# Patient Record
Sex: MALE | Race: WHITE | NOT HISPANIC OR LATINO | Employment: OTHER | URBAN - METROPOLITAN AREA
[De-identification: names, ages, dates, MRNs, and addresses within clinical notes are randomized per-mention and may not be internally consistent; named-entity substitution may affect disease eponyms.]

---

## 2019-12-11 ENCOUNTER — TRANSFERRED RECORDS (OUTPATIENT)
Dept: MULTI SPECIALTY CLINIC | Facility: CLINIC | Age: 64
End: 2019-12-11

## 2021-05-25 ENCOUNTER — RECORDS - HEALTHEAST (OUTPATIENT)
Dept: ADMINISTRATIVE | Facility: CLINIC | Age: 66
End: 2021-05-25

## 2022-07-01 ENCOUNTER — TRANSFERRED RECORDS (OUTPATIENT)
Dept: MULTI SPECIALTY CLINIC | Facility: CLINIC | Age: 67
End: 2022-07-01

## 2024-05-21 ENCOUNTER — TRANSCRIBE ORDERS (OUTPATIENT)
Dept: OTHER | Age: 69
End: 2024-05-21

## 2024-05-21 DIAGNOSIS — Z47.89 AFTERCARE FOLLOWING SURGERY OF THE MUSCULOSKELETAL SYSTEM: Primary | ICD-10-CM

## 2024-05-31 ENCOUNTER — THERAPY VISIT (OUTPATIENT)
Dept: PHYSICAL THERAPY | Facility: CLINIC | Age: 69
End: 2024-05-31
Payer: COMMERCIAL

## 2024-05-31 DIAGNOSIS — S99.912A LEFT ANKLE INJURY: Primary | ICD-10-CM

## 2024-05-31 PROCEDURE — 97112 NEUROMUSCULAR REEDUCATION: CPT | Mod: GP | Performed by: PHYSICAL THERAPIST

## 2024-05-31 PROCEDURE — 97110 THERAPEUTIC EXERCISES: CPT | Mod: GP | Performed by: PHYSICAL THERAPIST

## 2024-05-31 PROCEDURE — 97162 PT EVAL MOD COMPLEX 30 MIN: CPT | Mod: GP | Performed by: PHYSICAL THERAPIST

## 2024-05-31 NOTE — PROGRESS NOTES
PHYSICAL THERAPY EVALUATION  Type of Visit: Evaluation    See electronic medical record for Abuse and Falls Screening details.    Subjective       Presenting condition or subjective complaint: LEft ankle ORIF,  he had a stroke and then fell and fractured ankle.  He has CP but prior to injury was not using a cane or any assistve device.  He is in the clinic today using cane.  Date of onset: 02/24/24    Relevant medical history: Cancer; High blood pressure   Dates & types of surgery: 2/24/2024 left medial maleolus ORIF    Prior diagnostic imaging/testing results: X-ray     Prior therapy history for the same diagnosis, illness or injury:        Prior Level of Function  Transfers:   Ambulation:   ADL:   IADL:     Living Environment  Social support: With family members   Type of home: House   Stairs to enter the home:         Ramp:     Stairs inside the home: Yes (has a chairlift)       Help at home:    Equipment owned: Straight Cane; Walker; Standard wheelchair; Grab bars     Employment:      Hobbies/Interests: fishing, yardwork    Patient goals for therapy: Walk normally    Pain assessment: Pain denied     Objective   FOOT/ANKLE EVALUATION    INTEGUMENTARY (edema, incisions): WNL  POSTURE:   GAIT:   Weightbearing Status:   Assistive Device(s):   Gait Deviations: Stride length decreased  Toe out left  BALANCE/PROPRIOCEPTION: Single Leg Stance Eyes Open (seconds): unable  WEIGHT BEARING ALIGNMENT:   NON-WEIGHTBEARING ALIGNMENT:    ROM:   (Degrees) Left AROM Left PROM  Right AROM Right PROM   Ankle Dorsiflexion 0 deg      Ankle Plantarflexion 60 degrees      Ankle Inversion 30      Ankle Eversion 20      Great Toe Flexion       Great Toe Extension       Pain:   End feel:     STRENGTH:  5/5 DF, EV, INV, 4/5 PF  FLEXIBILITY:  decreased gastroc flexiblity  SPECIAL TESTS:   FUNCTIONAL TESTS:   PALPATION:   JOINT MOBILITY:  good subtalar mobility, decreased talocrural mobility    Assessment & Plan   CLINICAL  IMPRESSIONS  Medical Diagnosis: Left ankle ORIF    Treatment Diagnosis: Left ankle ORIF   Impression/Assessment: Patient is a 68 year old male with left ankle complaints.  The following significant findings have been identified: Decreased ROM/flexibility, Decreased strength, and Impaired muscle performance. These impairments interfere with their ability to perform recreational activities, household mobility, and community mobility as compared to previous level of function.     Clinical Decision Making (Complexity):  Clinical Presentation: Evolving/Changing  Clinical Presentation Rationale: based on medical and personal factors listed in PT evaluation  Clinical Decision Making (Complexity): Moderate complexity    PLAN OF CARE  Treatment Interventions:  Interventions: Manual Therapy, Neuromuscular Re-education, Therapeutic Activity, Therapeutic Exercise    Long Term Goals     PT Goal 1  Goal Identifier: walking  Goal Description: Ashkan will be able to walk without assive device with minimal gait abnormality  Rationale: to maximize safety and independence within the home;to maximize safety and independence within the community  Target Date: 07/26/24      Frequency of Treatment: 2 x per week x 4 weeks decreasing to 1 x per week x 4 weeks  Duration of Treatment: 8 weeks    Recommended Referrals to Other Professionals:   Education Assessment:        Risks and benefits of evaluation/treatment have been explained.   Patient/Family/caregiver agrees with Plan of Care.     Evaluation Time:     PT Eval, Moderate Complexity Minutes (14944): 15       Signing Clinician: Tammy Fuentes, PT      [facility]                                                                                   OUTPATIENT PHYSICAL THERAPY      PLAN OF TREATMENT FOR OUTPATIENT REHABILITATION   Patient's Last Name, First Name, NITHIN RodriguezAshkan  SAM YOB: 1955   Provider's Name   [facility]   Medical Record No.  [id number]     Onset Date: 02/24/24  Start of Care Date:       Medical Diagnosis:  Left ankle ORIF      PT Treatment Diagnosis:  Left ankle ORIF Plan of Treatment  Frequency/Duration: 2 x per week x 4 weeks decreasing to 1 x per week x 4 weeks/ 8 weeks    Certification date from 05/31/24 to 07/26/24         See note for plan of treatment details and functional goals     Tammy Fuentes, PT                         I CERTIFY THE NEED FOR THESE SERVICES FURNISHED UNDER        THIS PLAN OF TREATMENT AND WHILE UNDER MY CARE .             Physician Signature               Date    X_____________________________________________________                  Referring Provider:  Abisai Milan    Initial Assessment  See Epic Evaluation-

## 2024-06-07 ENCOUNTER — THERAPY VISIT (OUTPATIENT)
Dept: PHYSICAL THERAPY | Facility: CLINIC | Age: 69
End: 2024-06-07
Payer: COMMERCIAL

## 2024-06-07 DIAGNOSIS — S99.912A LEFT ANKLE INJURY: Primary | ICD-10-CM

## 2024-06-07 PROCEDURE — 97110 THERAPEUTIC EXERCISES: CPT | Mod: GP | Performed by: PHYSICAL THERAPIST

## 2024-06-07 PROCEDURE — 97140 MANUAL THERAPY 1/> REGIONS: CPT | Mod: GP | Performed by: PHYSICAL THERAPIST

## 2024-06-07 PROCEDURE — 97112 NEUROMUSCULAR REEDUCATION: CPT | Mod: GP | Performed by: PHYSICAL THERAPIST

## 2024-06-11 ENCOUNTER — THERAPY VISIT (OUTPATIENT)
Dept: PHYSICAL THERAPY | Facility: CLINIC | Age: 69
End: 2024-06-11
Payer: COMMERCIAL

## 2024-06-11 DIAGNOSIS — S99.912D INJURY OF LEFT ANKLE, SUBSEQUENT ENCOUNTER: Primary | ICD-10-CM

## 2024-06-11 PROCEDURE — 97112 NEUROMUSCULAR REEDUCATION: CPT | Mod: GP

## 2024-06-11 PROCEDURE — 97110 THERAPEUTIC EXERCISES: CPT | Mod: GP

## 2024-06-17 ENCOUNTER — THERAPY VISIT (OUTPATIENT)
Dept: PHYSICAL THERAPY | Facility: CLINIC | Age: 69
End: 2024-06-17
Payer: COMMERCIAL

## 2024-06-17 DIAGNOSIS — S99.912D INJURY OF LEFT ANKLE, SUBSEQUENT ENCOUNTER: Primary | ICD-10-CM

## 2024-06-17 PROCEDURE — 97112 NEUROMUSCULAR REEDUCATION: CPT | Mod: GP | Performed by: PHYSICAL THERAPIST

## 2024-06-17 PROCEDURE — 97110 THERAPEUTIC EXERCISES: CPT | Mod: GP | Performed by: PHYSICAL THERAPIST

## 2024-06-17 PROCEDURE — 97140 MANUAL THERAPY 1/> REGIONS: CPT | Mod: GP | Performed by: PHYSICAL THERAPIST

## 2024-06-19 ENCOUNTER — THERAPY VISIT (OUTPATIENT)
Dept: PHYSICAL THERAPY | Facility: CLINIC | Age: 69
End: 2024-06-19
Payer: COMMERCIAL

## 2024-06-19 DIAGNOSIS — S99.912D INJURY OF LEFT ANKLE, SUBSEQUENT ENCOUNTER: Primary | ICD-10-CM

## 2024-06-19 PROCEDURE — 97110 THERAPEUTIC EXERCISES: CPT | Mod: GP | Performed by: PHYSICAL THERAPIST

## 2024-06-19 PROCEDURE — 97112 NEUROMUSCULAR REEDUCATION: CPT | Mod: GP | Performed by: PHYSICAL THERAPIST

## 2024-06-19 PROCEDURE — 97140 MANUAL THERAPY 1/> REGIONS: CPT | Mod: GP | Performed by: PHYSICAL THERAPIST

## 2024-06-24 ENCOUNTER — THERAPY VISIT (OUTPATIENT)
Dept: PHYSICAL THERAPY | Facility: CLINIC | Age: 69
End: 2024-06-24
Payer: COMMERCIAL

## 2024-06-24 DIAGNOSIS — S99.912D INJURY OF LEFT ANKLE, SUBSEQUENT ENCOUNTER: Primary | ICD-10-CM

## 2024-06-24 PROCEDURE — 97112 NEUROMUSCULAR REEDUCATION: CPT | Mod: GP | Performed by: PHYSICAL THERAPIST

## 2024-06-24 PROCEDURE — 97140 MANUAL THERAPY 1/> REGIONS: CPT | Mod: GP | Performed by: PHYSICAL THERAPIST

## 2024-06-24 PROCEDURE — 97110 THERAPEUTIC EXERCISES: CPT | Mod: GP | Performed by: PHYSICAL THERAPIST

## 2024-06-26 ENCOUNTER — THERAPY VISIT (OUTPATIENT)
Dept: PHYSICAL THERAPY | Facility: CLINIC | Age: 69
End: 2024-06-26
Payer: COMMERCIAL

## 2024-06-26 DIAGNOSIS — S99.912D INJURY OF LEFT ANKLE, SUBSEQUENT ENCOUNTER: Primary | ICD-10-CM

## 2024-06-26 PROCEDURE — 97140 MANUAL THERAPY 1/> REGIONS: CPT | Mod: GP | Performed by: PHYSICAL THERAPIST

## 2024-06-26 PROCEDURE — 97110 THERAPEUTIC EXERCISES: CPT | Mod: GP | Performed by: PHYSICAL THERAPIST

## 2024-06-26 PROCEDURE — 97112 NEUROMUSCULAR REEDUCATION: CPT | Mod: GP | Performed by: PHYSICAL THERAPIST

## 2024-07-01 ENCOUNTER — OFFICE VISIT (OUTPATIENT)
Dept: FAMILY MEDICINE | Facility: CLINIC | Age: 69
End: 2024-07-01
Payer: COMMERCIAL

## 2024-07-01 VITALS
OXYGEN SATURATION: 98 % | DIASTOLIC BLOOD PRESSURE: 75 MMHG | HEART RATE: 65 BPM | HEIGHT: 69 IN | WEIGHT: 189.38 LBS | SYSTOLIC BLOOD PRESSURE: 111 MMHG | RESPIRATION RATE: 20 BRPM | BODY MASS INDEX: 28.05 KG/M2 | TEMPERATURE: 97.8 F

## 2024-07-01 DIAGNOSIS — Z85.238 HISTORY OF THYMUS CANCER: ICD-10-CM

## 2024-07-01 DIAGNOSIS — I77.810 ASCENDING AORTA DILATION (H): ICD-10-CM

## 2024-07-01 DIAGNOSIS — I25.10 CORONARY ARTERY CALCIFICATION SEEN ON CAT SCAN: ICD-10-CM

## 2024-07-01 DIAGNOSIS — Z87.81 S/P ORIF (OPEN REDUCTION INTERNAL FIXATION) FRACTURE: ICD-10-CM

## 2024-07-01 DIAGNOSIS — Z12.11 SCREEN FOR COLON CANCER: ICD-10-CM

## 2024-07-01 DIAGNOSIS — I63.00 CEREBRAL INFARCTION DUE TO THROMBOSIS OF PRECEREBRAL ARTERY (H): Primary | ICD-10-CM

## 2024-07-01 DIAGNOSIS — K80.20 CALCULUS OF GALLBLADDER WITHOUT CHOLECYSTITIS WITHOUT OBSTRUCTION: ICD-10-CM

## 2024-07-01 DIAGNOSIS — N40.0 BENIGN PROSTATIC HYPERPLASIA WITHOUT LOWER URINARY TRACT SYMPTOMS: ICD-10-CM

## 2024-07-01 DIAGNOSIS — R74.8 ELEVATED LIVER ENZYMES: ICD-10-CM

## 2024-07-01 DIAGNOSIS — E78.5 HYPERLIPIDEMIA LDL GOAL <100: ICD-10-CM

## 2024-07-01 DIAGNOSIS — G81.12 SPASTIC HEMIPARESIS OF LEFT DOMINANT SIDE (H): ICD-10-CM

## 2024-07-01 DIAGNOSIS — R73.01 ELEVATED FASTING GLUCOSE: ICD-10-CM

## 2024-07-01 DIAGNOSIS — Z11.59 NEED FOR HEPATITIS C SCREENING TEST: ICD-10-CM

## 2024-07-01 DIAGNOSIS — I10 BENIGN ESSENTIAL HYPERTENSION: ICD-10-CM

## 2024-07-01 DIAGNOSIS — G80.9 CEREBRAL PALSY, UNSPECIFIED TYPE (H): ICD-10-CM

## 2024-07-01 DIAGNOSIS — Z98.890 S/P ORIF (OPEN REDUCTION INTERNAL FIXATION) FRACTURE: ICD-10-CM

## 2024-07-01 LAB
ALBUMIN SERPL BCG-MCNC: 4.9 G/DL (ref 3.5–5.2)
ALP SERPL-CCNC: 79 U/L (ref 40–150)
ALT SERPL W P-5'-P-CCNC: 28 U/L (ref 0–70)
ANION GAP SERPL CALCULATED.3IONS-SCNC: 10 MMOL/L (ref 7–15)
AST SERPL W P-5'-P-CCNC: 28 U/L (ref 0–45)
BILIRUB SERPL-MCNC: 1.5 MG/DL
BUN SERPL-MCNC: 14.1 MG/DL (ref 8–23)
CALCIUM SERPL-MCNC: 9.4 MG/DL (ref 8.8–10.2)
CHLORIDE SERPL-SCNC: 104 MMOL/L (ref 98–107)
CHOLEST SERPL-MCNC: 118 MG/DL
CREAT SERPL-MCNC: 0.99 MG/DL (ref 0.67–1.17)
DEPRECATED HCO3 PLAS-SCNC: 26 MMOL/L (ref 22–29)
EGFRCR SERPLBLD CKD-EPI 2021: 82 ML/MIN/1.73M2
FASTING STATUS PATIENT QL REPORTED: YES
FASTING STATUS PATIENT QL REPORTED: YES
GLUCOSE SERPL-MCNC: 96 MG/DL (ref 70–99)
HBA1C MFR BLD: 5.7 % (ref 0–5.6)
HCV AB SERPL QL IA: NONREACTIVE
HDLC SERPL-MCNC: 57 MG/DL
LDLC SERPL CALC-MCNC: 41 MG/DL
NONHDLC SERPL-MCNC: 61 MG/DL
POTASSIUM SERPL-SCNC: 4.1 MMOL/L (ref 3.4–5.3)
PROT SERPL-MCNC: 8 G/DL (ref 6.4–8.3)
SODIUM SERPL-SCNC: 140 MMOL/L (ref 135–145)
TRIGL SERPL-MCNC: 101 MG/DL

## 2024-07-01 PROCEDURE — 83036 HEMOGLOBIN GLYCOSYLATED A1C: CPT | Performed by: FAMILY MEDICINE

## 2024-07-01 PROCEDURE — 80061 LIPID PANEL: CPT | Performed by: FAMILY MEDICINE

## 2024-07-01 PROCEDURE — 80053 COMPREHEN METABOLIC PANEL: CPT | Performed by: FAMILY MEDICINE

## 2024-07-01 PROCEDURE — 99204 OFFICE O/P NEW MOD 45 MIN: CPT | Performed by: FAMILY MEDICINE

## 2024-07-01 PROCEDURE — G2211 COMPLEX E/M VISIT ADD ON: HCPCS | Performed by: FAMILY MEDICINE

## 2024-07-01 PROCEDURE — 86803 HEPATITIS C AB TEST: CPT | Performed by: FAMILY MEDICINE

## 2024-07-01 PROCEDURE — 36415 COLL VENOUS BLD VENIPUNCTURE: CPT | Performed by: FAMILY MEDICINE

## 2024-07-01 RX ORDER — ATORVASTATIN CALCIUM 40 MG/1
40 TABLET, FILM COATED ORAL DAILY
COMMUNITY
End: 2024-09-24

## 2024-07-01 RX ORDER — ASPIRIN 81 MG/1
81 TABLET ORAL DAILY
COMMUNITY
Start: 2024-03-29

## 2024-07-01 RX ORDER — AMLODIPINE BESYLATE 2.5 MG/1
2.5 TABLET ORAL DAILY
COMMUNITY
End: 2024-09-24

## 2024-07-01 RX ORDER — RESPIRATORY SYNCYTIAL VIRUS VACCINE 120MCG/0.5
0.5 KIT INTRAMUSCULAR ONCE
Qty: 1 EACH | Refills: 0 | Status: CANCELLED | OUTPATIENT
Start: 2024-07-01 | End: 2024-07-01

## 2024-07-01 RX ORDER — MULTIPLE VITAMINS W/ MINERALS TAB 9MG-400MCG
1 TAB ORAL DAILY
COMMUNITY

## 2024-07-01 RX ORDER — GABAPENTIN 100 MG/1
1 CAPSULE ORAL AT BEDTIME
COMMUNITY
Start: 2024-05-20 | End: 2024-09-24

## 2024-07-01 RX ORDER — TAMSULOSIN HYDROCHLORIDE 0.4 MG/1
0.4 CAPSULE ORAL DAILY
COMMUNITY
Start: 2024-03-12 | End: 2024-09-24

## 2024-07-01 ASSESSMENT — PAIN SCALES - GENERAL: PAINLEVEL: NO PAIN (0)

## 2024-07-01 NOTE — PROGRESS NOTES
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 (H)  Reviewed documents and reports from care everywhere  Continue with baby aspirin, statin continue to control underlying lipids, hypertension,  Recommended to start seeing neurologist within the system  Continue home physical therapy rehab exercises/upper and lower extremities  - Adult Cardiology Eval  Referral; Future  - Adult Neurology  Referral; Future    Spastic hemiparesis of left dominant side (H)  Residual effect from recent CVA in February 2024  Other plan as mentioned above  - Adult Neurology  Referral; Future    Benign essential hypertension  BP Readings from Last 6 Encounters:   07/01/24 111/75     Blood pressure is at goal on current dose of amlodipine 2.5 mg once daily      Hyperlipidemia LDL goal <100  Patient is currently taking Lipitor 40 mg daily  He wants to have a recheck today  Will get the labs today  Will f/u on results and call with recommendations.    - Comprehensive metabolic panel (BMP + Alb, Alk Phos, ALT, AST, Total. Bili, TP); Future  - Lipid panel reflex to direct LDL Fasting; Future  - Adult Cardiology Eval  Referral; Future  - Comprehensive metabolic panel (BMP + Alb, Alk Phos, ALT, AST, Total. Bili, TP)  - Lipid panel reflex to direct LDL Fasting    S/P ORIF (open reduction internal fixation) fracture, left ankle  That happened following a fall when he sustained his CVA in February 2024  Patient had surgery on 3/29/2024  He is currently doing well, continue to monitor    Screen for colon cancer  Patient reports having a colonoscopy in December 2019 that did not [facility]  I am not able to see the results through Care Everywhere  Will send release of information at his next visit to [facility] to get reports    Need for hepatitis C screening test    - Hepatitis C Screen Reflex to HCV RNA Quant and Genotype; Future  - Hepatitis C Screen Reflex to HCV  "RNA Quant and Genotype    Benign prostatic hyperplasia without lower urinary tract symptoms  On Flomax daily    Elevated liver enzymes  Patient has history of elevated liver enzymes and he is concerned  He was drinking 1-2 beers per day until February 2024 after which he has not consumed any alcohol  - Comprehensive metabolic panel (BMP + Alb, Alk Phos, ALT, AST, Total. Bili, TP); Future  - Comprehensive metabolic panel (BMP + Alb, Alk Phos, ALT, AST, Total. Bili, TP)    Elevated fasting glucose  Will f/u on results and call with recommendations.    - Comprehensive metabolic panel (BMP + Alb, Alk Phos, ALT, AST, Total. Bili, TP); Future  - Hemoglobin A1c; Future  - Comprehensive metabolic panel (BMP + Alb, Alk Phos, ALT, AST, Total. Bili, TP)  - Hemoglobin A1c    Coronary artery calcification seen on CAT scan  Reviewed coronary calcification CT from 2018 showing significant elevated calcium score  Patient is currently taking baby aspirin and Lipitor 40 mg daily  He denies having previous or current cardiac symptoms  - Adult Cardiology Eval  Referral; Future    Calculus of gallbladder without cholecystitis without obstruction  Incidental finding from the CT, reviewed through Care Everywhere  Patient exam symptomatic, continue to monitor    History of thymus cancer  S/p  thymectomy in 2001    Cerebral palsy, unspecified type (H)    - Adult Neurology  Referral; Future    Ascending aorta dilation (H24), 4.1cm  Reviewed echo that was noted to have a 4.1 cm aortic dilation of ascending aorta  Patient is not aware of this finding  Recommended to follow-up with cardiology team for this  Continue to have the lipids, hypertension under control  - Adult Cardiology Eval  Referral; Future          BMI  Estimated body mass index is 28.14 kg/m  as calculated from the following:    Height as of this encounter: 1.747 m (5' 8.78\").    Weight as of this encounter: 85.9 kg (189 lb 6 oz).         Chart " documentation done in part with Dragon Voice recognition Software. Although reviewed after completion, some word and grammatical error may remain.    See Patient Instructions    Irma Luther is a 68 year old, presenting for the following health issues:  Recheck Medication and Establish Care        7/1/2024    12:24 PM   Additional Questions   Roomed by Diane Lynne Schoenherr RN     Via the Health Maintenance questionnaire, the patient has reported the following services have been completed -Colonscopy: [facility] 2022-07-01, this information has been sent to the abstraction team.  History of Present Illness       Reason for visit:  Initial consultation    He eats 0-1 servings of fruits and vegetables daily.He consumes 1 sweetened beverage(s) daily.He exercises with enough effort to increase his heart rate 10 to 19 minutes per day.  He exercises with enough effort to increase his heart rate 4 days per week.   He is taking medications regularly.  Is new to the provider, is here to establish care  Has a past medical history significant for cerebral palsy, status post a thymectomy for history of thymus cancer, hypertension, hyperlipidemia and BPH  Patient recently had a CVA in February 2024, admitted at the [facility] has been taking baby aspirin since then along with a statin  Patient also had a fall from CVA, resulted in left ankle fracture that was treated with open reduction internal fixation in March 2024  Patient also reports having a coronary artery calcium CT in 2018 showing severe calcifications  Was seen by cardiologist with the [facility] system for follow-up  Patient is also having spasticity on the left upper extremity from the CVA, though he is continuing with the home exercises that he was started at the rehab   patient is left-handed  Has no concerns with slurred speech, vision problems        Hyperlipidemia Follow-Up    Are you regularly taking any medication or supplement to lower your  "cholesterol?   Yes- Lipitor  Are you having muscle aches or other side effects that you think could be caused by your cholesterol lowering medication?  No    Hypertension Follow-up    Do you check your blood pressure regularly outside of the clinic? Yes   Are you following a low salt diet? Yes  Are your blood pressures ever more than 140 on the top number (systolic) OR more   than 90 on the bottom number (diastolic), for example 140/90? No    Vascular Disease Follow-up  Coronary calcification from CT scan  How often do you take nitroglycerin? Never  Do you take an aspirin every day? Yes    Cerebrovascular Follow-up    Patient history: ischemic cerebrovascular incident  Residual symptoms: Spasticity of the left upper extremity  Worsened or new symptoms since last visit: This is new visit with this writer  Daily aspirin use: Yes  Hypertension controlled: Yes    Patient also reports he has been taking gabapentin 100 mg daily but he is not aware of the medical condition that he is taking this gabapentin for  This seemed to be started at the hospital discharge        Review of Systems  CONSTITUTIONAL: NEGATIVE for fever, chills, change in weight  RESP: NEGATIVE for significant cough or SOB  CV: NEGATIVE for chest pain, palpitations or peripheral edema  CV: Coronary calcification and Hx HTN  GI: NEGATIVE for nausea, abdominal pain, heartburn, or change in bowel habits  : BPH  MUSCULOSKELETAL: History of cerebral palsy, left upper extremity spasticity  NEURO: History of CVA  ENDOCRINE: NEGATIVE for temperature intolerance, skin/hair changes  HEME/ALLERGY/IMMUNE: NEGATIVE for bleeding problems  PSYCHIATRIC: NEGATIVE for changes in mood or affect      Objective    /75 (BP Location: Right arm, Patient Position: Sitting, Cuff Size: Adult Large)   Pulse 65   Temp 97.8  F (36.6  C) (Oral)   Resp 20   Ht 1.747 m (5' 8.78\")   Wt 85.9 kg (189 lb 6 oz)   SpO2 98%   BMI 28.14 kg/m    Body mass index is 28.14 " kg/m .  Physical Exam   GENERAL: alert and no distress  RESP: lungs clear to auscultation - no rales, rhonchi or wheezes  CV: regular rate and rhythm, normal S1 S2, no S3 or S4, no murmur, click or rub, no peripheral edema   NEURO: Normal strength and tone, mentation intact and speech normal  PSYCH: mentation appears normal, affect normal/bright            Signed Electronically by: Rosa Harmon MD

## 2024-07-01 NOTE — Clinical Note
Please abstract the following data from this visit with this patient into the appropriate field in Epic:  Tests that can be patient reported without a hard copy:  Colonoscopy done on this date: 12/11/19 (approximately), by this group: [facility], results were reportedly negative per patient.   Other Tests found in the patient's chart through Chart Review/Care Everywhere:    Note to Abstraction: If this section is blank, no results were found via Chart Review/Care Everywhere.

## 2024-07-02 ENCOUNTER — TELEPHONE (OUTPATIENT)
Dept: CARDIOLOGY | Facility: CLINIC | Age: 69
End: 2024-07-02
Payer: COMMERCIAL

## 2024-07-02 NOTE — TELEPHONE ENCOUNTER
[facility] Call Center    Phone Message    May a detailed message be left on voicemail: yes     Reason for Call: Appointment Intake    Referring Provider Name: Dr. Harmon    Diagnosis and/or Symptoms: Cerebral infarction due to thrombosis of precerebral artery (H) [I63.00]  Hyperlipidemia LDL goal <100 [E78.5]  Coronary artery calcification seen on CAT scan [I25.10]  Ascending aorta dilation (H24) [I77.810]     Please call pt to schedule once loop recorder device check is in system.      Action Taken: Other: cardio    Travel Screening: Not Applicable     Date of Service:

## 2024-07-02 NOTE — RESULT ENCOUNTER NOTE
Darryl Luther,  Your recent labs showed excellent fasting cholesterol numbers, normal kidney functions and fasting blood sugar  Your hepatitis C screening is negative  Your A1c-diabetes screening test is slightly elevated consistent with early prediabetes which will improve with improving weight loss, regular exercises and eating a diet free of sugars and low in simple carbohydrates to.  Your liver enzymes are normal   one of the liver test-bilirubin is slightly elevated which can happen in the setting of prolonged fasting  I would recommend we recheck that at your next visit in 3 months,making sure you do not fast for the appointment at that time.   Let me know if you have any questions. Take care.  Rosa Harmon MD

## 2024-07-03 ENCOUNTER — THERAPY VISIT (OUTPATIENT)
Dept: PHYSICAL THERAPY | Facility: CLINIC | Age: 69
End: 2024-07-03
Payer: COMMERCIAL

## 2024-07-03 DIAGNOSIS — S99.912D INJURY OF LEFT ANKLE, SUBSEQUENT ENCOUNTER: Primary | ICD-10-CM

## 2024-07-03 PROCEDURE — 97112 NEUROMUSCULAR REEDUCATION: CPT | Mod: GP | Performed by: PHYSICAL THERAPIST

## 2024-07-03 PROCEDURE — 97110 THERAPEUTIC EXERCISES: CPT | Mod: GP | Performed by: PHYSICAL THERAPIST

## 2024-07-05 NOTE — TELEPHONE ENCOUNTER
[facility] Call Center    Phone Message    May a detailed message be left on voicemail: yes     Reason for Call: Other: Pt calling to schedule appointment. Please reach out to assist. Pt has a loop recorder.      Action Taken: Other: Cardiology    Travel Screening: Not Applicable     Date of Service:             Thank you!  [facility]

## 2024-07-08 ENCOUNTER — OFFICE VISIT (OUTPATIENT)
Dept: NEUROLOGY | Facility: CLINIC | Age: 69
End: 2024-07-08
Attending: FAMILY MEDICINE
Payer: COMMERCIAL

## 2024-07-08 VITALS — OXYGEN SATURATION: 96 % | DIASTOLIC BLOOD PRESSURE: 78 MMHG | SYSTOLIC BLOOD PRESSURE: 120 MMHG | HEART RATE: 60 BPM

## 2024-07-08 DIAGNOSIS — I65.01 STENOSIS OF RIGHT VERTEBRAL ARTERY: ICD-10-CM

## 2024-07-08 DIAGNOSIS — R73.03 PRE-DIABETES: ICD-10-CM

## 2024-07-08 DIAGNOSIS — I10 ESSENTIAL HYPERTENSION: ICD-10-CM

## 2024-07-08 DIAGNOSIS — G81.12 SPASTIC HEMIPARESIS OF LEFT DOMINANT SIDE (H): ICD-10-CM

## 2024-07-08 DIAGNOSIS — E78.5 DYSLIPIDEMIA: ICD-10-CM

## 2024-07-08 DIAGNOSIS — I63.00 CEREBRAL INFARCTION DUE TO THROMBOSIS OF PRECEREBRAL ARTERY (H): ICD-10-CM

## 2024-07-08 DIAGNOSIS — I63.531 CEREBRAL INFARCTION DUE TO OCCLUSION OF RIGHT POSTERIOR CEREBRAL ARTERY (H): Primary | ICD-10-CM

## 2024-07-08 DIAGNOSIS — G80.9 CEREBRAL PALSY, UNSPECIFIED TYPE (H): ICD-10-CM

## 2024-07-08 PROCEDURE — 99205 OFFICE O/P NEW HI 60 MIN: CPT | Performed by: PSYCHIATRY & NEUROLOGY

## 2024-07-08 NOTE — PATIENT INSTRUCTIONS
Diagnosis:   Stroke in Feb 2024 in the territory of the right posterior cerebral artery (PCA) that appeared to be blocked at the time of your stroke. Fortunately it did not leave you with significant effect. The cause of that PCA blockage is unclear. Your right vertebral artery is non dominant and it is possible that it had blockage and small pieces of the plaque went down stream through the basilar artery to the right PCA. However, non dominant vertebral arteries may not contribute to the basilar artery at all and just end with a small branch. If that is the case then it cannot explain your stroke which makes us think of other possibilities, most importantly intermittent atrial fibrillation. You do have LINQ device implanted to monitor your heart and detect any atrial fibrillation. So far none has been detected.     Plan:   CTA angiogram of the head and neck to have a better look at that right vertebral artery and to assess the recanalization of the right PCA.   Continue on the current medications.   Continue to monitor your blood pressure at home. Your target is less than 130/80.   If/when LINQ detects atrial fibrillation, we should switch from aspirin to one of the new blood thinners (xarelto, pradaxa, or eliquis).     Follow up with me in 6 month. Call earlier if you need to.     Nat Garcia MD, Msc, FAMAXWELL, FAAN   of Neurology  [facility]

## 2024-07-08 NOTE — NURSING NOTE
"Ashkan Rodriguez is a 68 year old male who presents for:  Chief Complaint   Patient presents with    Stroke     Referred by Rosa Harmon MD    Cerebral infarction due to thrombosis of precerebral artery   Spastic hemiparesis of left dominant side   Cerebral palsy, unspecified type             Initial Vitals:  /78   Pulse 60   SpO2 96%  Estimated body mass index is 28.14 kg/m  as calculated from the following:    Height as of 7/1/24: 1.747 m (5' 8.78\").    Weight as of 7/1/24: 85.9 kg (189 lb 6 oz).. There is no height or weight on file to calculate BSA. BP completed using cuff size: zeny Saldivar    "

## 2024-07-08 NOTE — LETTER
7/8/2024      Ashkan Rodriguez  [address]  [facility] [address]      Dear Colleague,    Thank you for referring your patient, Ashkan Rodriguez, to the [facility]. Please see a copy of my visit note below.      _____________________________________________________________    [facility]    at [facility] [phone]  _____________________________________________________________      Chief Complaint: recent stroke    History of Present Illness: Ashkan Rodriguez is a 68 year old left handed retired male  who had a stroke in Feb 2024.    The patient has history of chronic left facial weakness and chronic right sided weakness since childhood. He was told that his delivery was obstructed and that he got stuck in the birth canal until the doctor was able to turn his head and pull him out. He was not aware of any physical effects of that incident until he was subjected to medical examination when he joined the [employer]. At that time, the doctors found that his right upper and lower extremities were smaller than the left and deduced that he had cerebral palsy. Despite that, the patient lived his life fully, was completely independent, worked as an . Used to work as an  for [employer]. 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     Regarding his stroke, the patient recalls the symptoms. He was sitting. Got up and had vision change, lost vision for 5 seconds and came back right away. Sat back down. Got up again and fell, broke left ankle. Left UE was also limp. This happened around 3 am [place]. A couple of hours later he called ambulance. Weakness improved over the next few hours. He was initially seen in a small local hospital then transferred to [facility] where he was admitted for his acute stroke and left ankle fracture care from 2/10 to 2/13/24.   MRI brain showed acute  stroke right thalamus and right hippocampus, also ventriculomegaly out of proportion to cortical atrophy. There was also ex vacuo dilatation of the lateral ventricle.  MRA done at the same time and showed R P2 occlusion. L vert is dominant, the R vert is non-dominant and intermittently appear faintly along its course.   LDL 31.   A1C 5.7.   TTE showed normal EF, no focal hypokinesis, no intracardiac masses or thrombi.   Two 14-d zio patch were done after discharge and showed no AFIB.   Loop recorder was implanted 4/18/24.     He was put on DAPT for 3 months then ASA monotherapy. He went to TCU after hospitalization. Now home. Mother lives with him. He is her caregiver.     Open reduction internal fixation of left Medial Malleolus ankle fracture was done on 3/29/24.     Patient reports mild residual symptoms: Left face and arm numbness, decreased dexterity of the left hand, and occasional tightness of the muscles of the left UE. However, symptoms have been improving and he is relying on his dominant left hand more than the right (chronic weak smaller hand).   Checks BP at home, usually 115-120/70s. Used to be higher before the stroke.   Made some dietary changes. Lost 10 Ib since stroke. Has a billbox. Does not miss or forget.     Doing PT for left ankle.   Discharged from OT and speech therapy.   Functionally, is back to everything but some tasks take him longer.     mRS 1.         Diagnosis:  Problem List Items Addressed This Visit          Nervous and Auditory    Cerebral infarction (H) - Primary    Relevant Orders    CTA Head Neck with Contrast    Spastic hemiparesis of left dominant side (H)    Cerebral palsy, unspecified type (H)     Other Visit Diagnoses       Stenosis of right vertebral artery        Relevant Orders    CTA Head Neck with Contrast    Essential hypertension        Dyslipidemia        Pre-diabetes                Impression & Plan:     As discussed with the patient    Diagnosis:   Stroke in Feb  "2024 in the territory of the right posterior cerebral artery (PCA) that appeared to be blocked at the time of your stroke. Fortunately it did not leave you with significant effect. The cause of that PCA blockage is unclear. Your right vertebral artery is non dominant and it is possible that it had blockage and small pieces of the plaque went down stream through the basilar artery to the right PCA. However, non dominant vertebral arteries may not contribute to the basilar artery at all and just end with a small branch. If that is the case then it cannot explain your stroke which makes us think of other possibilities, most importantly intermittent atrial fibrillation. You do have LINQ device implanted to monitor your heart and detect any atrial fibrillation. So far none has been detected.     Plan:   CTA angiogram of the head and neck to have a better look at that right vertebral artery and to assess the recanalization of the right PCA.   Continue on the current medications.   Continue to monitor your blood pressure at home. Your target is less than 130/80.   If/when LINQ detects atrial fibrillation, we should switch from aspirin to one of the new blood thinners (xarelto, pradaxa, or eliquis).     Follow up with me in 6 month. Call earlier if you need to.     Stroke Education provided.  He will call us with any questions.  For any acute neurologic deficits he was advised to  go directly to the hospital rather than call the clinic.    aNt Garica MD, Msc, MYRNA EASON   of Neurology  [facility]     07/08/2024 9:40 AM  To page me or covering stroke neurology team member, click here: AMCOM  Choose \"On Call\" tab at top, then search dropdown box for \"Neurology Adult\" & press Enter, look for Neuro ICU/Stroke    ___________________________________________________________________    Current Medications  Current Outpatient Medications   Medication Sig Dispense Refill     amLODIPine " (NORVASC) 2.5 MG tablet Take 2.5 mg by mouth daily       aspirin 81 MG EC tablet Take 81 mg by mouth daily       atorvastatin (LIPITOR) 40 MG tablet Take 40 mg by mouth daily       gabapentin (NEURONTIN) 100 MG capsule Take 1 capsule by mouth at bedtime       multivitamin w/minerals (THERA-VIT-M) tablet Take 1 tablet by mouth daily       tamsulosin (FLOMAX) 0.4 MG capsule Take 0.4 mg by mouth daily       No current facility-administered medications for this visit.       Past Medical History  Past Medical History:   Diagnosis Date     Cancer (H) 10/01/2001     Cerebral infarction (H) 2/10/2024     Heart disease 2018     Hypertension        Social History  Social History     Tobacco Use     Smoking status: Never     Passive exposure: Yes     Smokeless tobacco: Never     Tobacco comments:     1/2 pack /year   Vaping Use     Vaping status: Never Used   Substance Use Topics     Alcohol use: Not Currently     Comment: 1-2 beers/day     Drug use: Never     Never . No children.     Family History  Family History   Problem Relation Age of Onset     Hypertension Mother      Hypertension Father      Ulcers Father      Hypertension Sister      Hyperlipidemia Sister      Hypertension Sister      Hypertension Brother      Lung Cancer Brother      Lung Cancer Maternal Grandmother      Cerebrovascular Disease Maternal Grandfather      Coronary Artery Disease Paternal Grandmother      Paternal grandmother  with heart attack.   Father had stroke in his 30s.       Physical Exam    /78   Pulse 60   SpO2 96%     General Exam:  GEN:  Awake, NAD  HEAD:  Atraumatic/Normocephalic  EYES:  Non-icterus, no conjunctivitis  EARS:  No otic discharge  NOSE:  Patent nares, no discharge  THROAT:  MMM, No oral lesions  CV:  RRR  PULM:  Breathing comfortably on room air, no tachypnea, not using accessory muscles of respiration  MSK:  No peripheral edema  SKIN:  No dermatitis, no apparent rash     NEUROLOGICAL EXAM:  Mental State:    Awake, Oriented to time, place, and persons. Able to spell 'world' forward and backward, able to subtract serial 7s from 100, able to follow trail B, recall 2 out of 3 items then got the third one with a cue. Reactive affect.   LANGUAGE/SPEECH:    No dysphasia or paraphasic errors.    CN:   I:  Deferred  II:  VFF  III/IV/VI: EOMI, no nystagmus  V:  decreased pinprick left face   VII:  left lower half of the face is weak with recurrent synkinesis   VIII:  Hearing was intact to conversational voice  IX/X:  Palatal raise was intact bilaterally  XI:  Shoulder shrug was strong bilaterally; Pt is able to rotate head left and right against resistance  XII:  Tongue midline; able to move it left and right without any difficulties  MOTOR:  Left pronator drift.   Subtle weakness of the left finger abduction and finger extension compared to right  Sensory:   No hemihypesthesia  Coordination:  Intact finger to nose and heel to shin bilaterally; no dysmetria or decomposition of movement  Involuntary Movement:    None observed  Gait:    Walks without assistive devices       Neuroimaging: as per HPI. I personally reviewed those images with the patient:   MRI brain and CTA head and neck done in Feb 2024.     Labs:    Recent Labs   Lab Test 07/01/24  1412   CHOL 118   HDL 57   LDL 41   TRIG 101       Recent Labs   Lab Test 07/01/24  1412   A1C 5.7*         Billing disclosure:    60 min spent on the date of the encounter in chart review, patient visit, review of tests, documentation and/or discussion with other providers about the issues documented above.       Again, thank you for allowing me to participate in the care of your patient.        Sincerely,        Nat Garcia MD

## 2024-07-08 NOTE — PROGRESS NOTES
_____________________________________________________________    [facility]    at [facility] and Brain [facility] [phone]  _____________________________________________________________      Chief Complaint: recent stroke    History of Present Illness: Ashkan Rodriguez is a 68 year old left handed retired male  who had a stroke in Feb 2024.    The patient has history of chronic left facial weakness and chronic right sided weakness since childhood. He was told that his delivery was obstructed and that he got stuck in the birth canal until the doctor was able to turn his head and pull him out. He was not aware of any physical effects of that incident until he was subjected to medical examination when he joined the [employer]. At that time, the doctors found that his right upper and lower extremities were smaller than the left and deduced that he had cerebral palsy. Despite that, the patient lived his life fully, was completely independent, worked as an . Used to work as an  for [employer]. 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     Regarding his stroke, the patient recalls the symptoms. He was sitting. Got up and had vision change, lost vision for 5 seconds and came back right away. Sat back down. Got up again and fell, broke left ankle. Left UE was also limp. This happened around 3 am [place] was [place] his [place]. A couple of hours later he called ambulance. Weakness improved over the next few hours. He was initially seen in a small local hospital then transferred to [facility] where he was admitted for his acute stroke and left ankle fracture care from 2/10 to 2/13/24.   MRI brain showed acute stroke right thalamus and right hippocampus, also ventriculomegaly out of proportion to cortical atrophy. There was also ex vacuo dilatation of the lateral ventricle.  MRA done at the same time and showed R P2 occlusion. L vert is dominant, the R  vert is non-dominant and intermittently appear faintly along its course.   LDL 31.   A1C 5.7.   TTE showed normal EF, no focal hypokinesis, no intracardiac masses or thrombi.   Two 14-d zio patch were done after discharge and showed no AFIB.   Loop recorder was implanted 4/18/24.     He was put on DAPT for 3 months then ASA monotherapy. He went to TCU after hospitalization. Now home. Mother lives with him. He is her caregiver.     Open reduction internal fixation of left Medial Malleolus ankle fracture was done on 3/29/24.     Patient reports mild residual symptoms: Left face and arm numbness, decreased dexterity of the left hand, and occasional tightness of the muscles of the left UE. However, symptoms have been improving and he is relying on his dominant left hand more than the right (chronic weak smaller hand).   Checks BP at home, usually 115-120/70s. Used to be higher before the stroke.   Made some dietary changes. Lost 10 Ib since stroke. Has a billbox. Does not miss or forget.     Doing PT for left ankle.   Discharged from OT and speech therapy.   Functionally, is back to everything but some tasks take him longer.     mRS 1.         Diagnosis:  Problem List Items Addressed This Visit          Nervous and Auditory    Cerebral infarction (H) - Primary    Relevant Orders    CTA Head Neck with Contrast    Spastic hemiparesis of left dominant side (H)    Cerebral palsy, unspecified type (H)     Other Visit Diagnoses       Stenosis of right vertebral artery        Relevant Orders    CTA Head Neck with Contrast    Essential hypertension        Dyslipidemia        Pre-diabetes                Impression & Plan:     As discussed with the patient    Diagnosis:   Stroke in Feb 2024 in the territory of the right posterior cerebral artery (PCA) that appeared to be blocked at the time of your stroke. Fortunately it did not leave you with significant effect. The cause of that PCA blockage is unclear. Your right vertebral  "artery is non dominant and it is possible that it had blockage and small pieces of the plaque went down stream through the basilar artery to the right PCA. However, non dominant vertebral arteries may not contribute to the basilar artery at all and just end with a small branch. If that is the case then it cannot explain your stroke which makes us think of other possibilities, most importantly intermittent atrial fibrillation. You do have LINQ device implanted to monitor your heart and detect any atrial fibrillation. So far none has been detected.     Plan:   CTA angiogram of the head and neck to have a better look at that right vertebral artery and to assess the recanalization of the right PCA.   Continue on the current medications.   Continue to monitor your blood pressure at home. Your target is less than 130/80.   If/when LINQ detects atrial fibrillation, we should switch from aspirin to one of the new blood thinners (xarelto, pradaxa, or eliquis).     Follow up with me in 6 month. Call earlier if you need to.     Stroke Education provided.  He will call us with any questions.  For any acute neurologic deficits he was advised to  go directly to the hospital rather than call the clinic.    Nat Garcia MD, Msc, MYRNA EASON   of Neurology  [facility]     07/08/2024 9:40 AM  To page me or covering stroke neurology team member, click here: AMCOM  Choose \"On Call\" tab at top, then search dropdown box for \"Neurology Adult\" & press Enter, look for Neuro ICU/Stroke    ___________________________________________________________________    Current Medications  Current Outpatient Medications   Medication Sig Dispense Refill    amLODIPine (NORVASC) 2.5 MG tablet Take 2.5 mg by mouth daily      aspirin 81 MG EC tablet Take 81 mg by mouth daily      atorvastatin (LIPITOR) 40 MG tablet Take 40 mg by mouth daily      gabapentin (NEURONTIN) 100 MG capsule Take 1 capsule by mouth at bedtime   "    multivitamin w/minerals (THERA-VIT-M) tablet Take 1 tablet by mouth daily      tamsulosin (FLOMAX) 0.4 MG capsule Take 0.4 mg by mouth daily       No current facility-administered medications for this visit.       Past Medical History  Past Medical History:   Diagnosis Date    Cancer (H) 10/01/2001    Cerebral infarction (H) 2/10/2024    Heart disease 2018    Hypertension        Social History  Social History     Tobacco Use    Smoking status: Never     Passive exposure: Yes    Smokeless tobacco: Never    Tobacco comments:     1/2 pack /year   Vaping Use    Vaping status: Never Used   Substance Use Topics    Alcohol use: Not Currently     Comment: 1-2 beers/day    Drug use: Never     Never . No children.     Family History  Family History   Problem Relation Age of Onset    Hypertension Mother     Hypertension Father     Ulcers Father     Hypertension Sister     Hyperlipidemia Sister     Hypertension Sister     Hypertension Brother     Lung Cancer Brother     Lung Cancer Maternal Grandmother     Cerebrovascular Disease Maternal Grandfather     Coronary Artery Disease Paternal Grandmother      Paternal grandmother  with heart attack.   Father had stroke in his 30s.       Physical Exam    /78   Pulse 60   SpO2 96%     General Exam:  GEN:  Awake, NAD  HEAD:  Atraumatic/Normocephalic  EYES:  Non-icterus, no conjunctivitis  EARS:  No otic discharge  NOSE:  Patent nares, no discharge  THROAT:  MMM, No oral lesions  CV:  RRR  PULM:  Breathing comfortably on room air, no tachypnea, not using accessory muscles of respiration  MSK:  No peripheral edema  SKIN:  No dermatitis, no apparent rash     NEUROLOGICAL EXAM:  Mental State:   Awake, Oriented to time, place, and persons. Able to spell 'world' forward and backward, able to subtract serial 7s from 100, able to follow trail B, recall 2 out of 3 items then got the third one with a cue. Reactive affect.   LANGUAGE/SPEECH:    No dysphasia or paraphasic  errors.    CN:   I:  Deferred  II:  VFF  III/IV/VI: EOMI, no nystagmus  V:  decreased pinprick left face   VII:  left lower half of the face is weak with recurrent synkinesis   VIII:  Hearing was intact to conversational voice  IX/X:  Palatal raise was intact bilaterally  XI:  Shoulder shrug was strong bilaterally; Pt is able to rotate head left and right against resistance  XII:  Tongue midline; able to move it left and right without any difficulties  MOTOR:  Left pronator drift.   Subtle weakness of the left finger abduction and finger extension compared to right  Sensory:   No hemihypesthesia  Coordination:  Intact finger to nose and heel to shin bilaterally; no dysmetria or decomposition of movement  Involuntary Movement:    None observed  Gait:    Walks without assistive devices       Neuroimaging: as per HPI. I personally reviewed those images with the patient:   MRI brain and CTA head and neck done in Feb 2024.     Labs:    Recent Labs   Lab Test 07/01/24  1412   CHOL 118   HDL 57   LDL 41   TRIG 101       Recent Labs   Lab Test 07/01/24  1412   A1C 5.7*         Billing disclosure:    60 min spent on the date of the encounter in chart review, patient visit, review of tests, documentation and/or discussion with other providers about the issues documented above.

## 2024-07-08 NOTE — TELEPHONE ENCOUNTER
7/8/2024 4:01PM Maryann Allen  Left Voicemail (1st Attempt) and Sent Mychart (1st Attempt) for the patient to call back and schedule the following:    Appointment type: New Preventative  Provider: Any Gen Jacque MCBRIDE  Return date: Next available  Specialty phone number: [phone] option 1  Additional appointment(s) needed: Pt may need device check prior d/t Loop Recorder  Additonal Notes: 7/8 LVM and MYC for pt to schedule New Preventative w/ any gen card MD. Pt may also need a device check prior for loop recorder. CIERA Allen 7/8/2024 4:01PM

## 2024-07-09 ENCOUNTER — TELEPHONE (OUTPATIENT)
Dept: CARDIOLOGY | Facility: CLINIC | Age: 69
End: 2024-07-09
Payer: COMMERCIAL

## 2024-07-09 NOTE — TELEPHONE ENCOUNTER
Attempted to call patient. Left message for patient.    Ariella Izaguirre, RN, BSN  Cardiology RN Care Coordinator   [place]   Phone: [phone]  Fax: [phone] ([place]) [phone] ([place])

## 2024-07-09 NOTE — TELEPHONE ENCOUNTER
Health Call Center    Phone Message    May a detailed message be left on voicemail: yes     Reason for Call: Other: Ashkan is having a device check with [facility] on 7/11 and is wondering if that will be sufficient for his appointment with Dr. Romano on 8/8.  Please reach out to Ashkan if needed.     Action Taken: Other: Cardiology    Travel Screening: Not Applicable     Thank you!  Specialty Access Center

## 2024-07-10 ENCOUNTER — THERAPY VISIT (OUTPATIENT)
Dept: PHYSICAL THERAPY | Facility: CLINIC | Age: 69
End: 2024-07-10
Payer: COMMERCIAL

## 2024-07-10 DIAGNOSIS — S99.912D INJURY OF LEFT ANKLE, SUBSEQUENT ENCOUNTER: Primary | ICD-10-CM

## 2024-07-10 PROCEDURE — 97112 NEUROMUSCULAR REEDUCATION: CPT | Mod: GP | Performed by: PHYSICAL THERAPIST

## 2024-07-10 PROCEDURE — 97110 THERAPEUTIC EXERCISES: CPT | Mod: GP | Performed by: PHYSICAL THERAPIST

## 2024-07-10 NOTE — TELEPHONE ENCOUNTER
Called and spoke with patient. Patient is scheduled to have device checked through [facility] tomorrow. Patient plans to keep appointment on 8/8 with Dr. Romano and will then potentially switch to have device check with [facility] at that time.    Ariella Izaguirre, RN, BSN  Cardiology RN Care Coordinator   [place]   Phone: [phone]  Fax: [phone] ([place]) [phone] ([place])

## 2024-07-10 NOTE — TELEPHONE ENCOUNTER
[facility] Call Center    Phone Message    May a detailed message be left on voicemail: yes     Reason for Call: Patient state he is calling Ariella back. Please return his call.     Action Taken: Other: cardiology    Travel Screening: Not Applicable   Thank you!  Specialty Access Center    Date of Service:

## 2024-07-10 NOTE — TELEPHONE ENCOUNTER
Second attempt to call patient. Left message for patient.    Ariella Izaguirre, RN, BSN  Cardiology RN Care Coordinator   [place]   Phone: [phone]  Fax: [phone] ([place]) [phone] ([place])

## 2024-07-12 ENCOUNTER — THERAPY VISIT (OUTPATIENT)
Dept: PHYSICAL THERAPY | Facility: CLINIC | Age: 69
End: 2024-07-12
Payer: COMMERCIAL

## 2024-07-12 DIAGNOSIS — S99.912D INJURY OF LEFT ANKLE, SUBSEQUENT ENCOUNTER: Primary | ICD-10-CM

## 2024-07-12 PROCEDURE — 97110 THERAPEUTIC EXERCISES: CPT | Mod: GP | Performed by: PHYSICAL THERAPIST

## 2024-07-12 PROCEDURE — 97140 MANUAL THERAPY 1/> REGIONS: CPT | Mod: GP | Performed by: PHYSICAL THERAPIST

## 2024-07-12 PROCEDURE — 97112 NEUROMUSCULAR REEDUCATION: CPT | Mod: GP | Performed by: PHYSICAL THERAPIST

## 2024-07-16 ENCOUNTER — ANCILLARY PROCEDURE (OUTPATIENT)
Dept: CT IMAGING | Facility: CLINIC | Age: 69
End: 2024-07-16
Attending: PSYCHIATRY & NEUROLOGY
Payer: COMMERCIAL

## 2024-07-16 DIAGNOSIS — I63.531 CEREBRAL INFARCTION DUE TO OCCLUSION OF RIGHT POSTERIOR CEREBRAL ARTERY (H): ICD-10-CM

## 2024-07-16 DIAGNOSIS — I65.01 STENOSIS OF RIGHT VERTEBRAL ARTERY: ICD-10-CM

## 2024-07-16 PROCEDURE — 70498 CT ANGIOGRAPHY NECK: CPT | Mod: TC | Performed by: RADIOLOGY

## 2024-07-16 PROCEDURE — 70496 CT ANGIOGRAPHY HEAD: CPT | Mod: TC | Performed by: RADIOLOGY

## 2024-07-16 RX ORDER — IOPAMIDOL 755 MG/ML
67 INJECTION, SOLUTION INTRAVASCULAR ONCE
Status: COMPLETED | OUTPATIENT
Start: 2024-07-16 | End: 2024-07-16

## 2024-07-16 RX ADMIN — IOPAMIDOL 134 ML: 755 INJECTION, SOLUTION INTRAVASCULAR at 09:12

## 2024-07-31 ENCOUNTER — THERAPY VISIT (OUTPATIENT)
Dept: PHYSICAL THERAPY | Facility: CLINIC | Age: 69
End: 2024-07-31
Payer: COMMERCIAL

## 2024-07-31 DIAGNOSIS — S99.912D INJURY OF LEFT ANKLE, SUBSEQUENT ENCOUNTER: Primary | ICD-10-CM

## 2024-07-31 PROCEDURE — 97110 THERAPEUTIC EXERCISES: CPT | Mod: GP | Performed by: PHYSICAL THERAPIST

## 2024-07-31 PROCEDURE — 97112 NEUROMUSCULAR REEDUCATION: CPT | Mod: GP | Performed by: PHYSICAL THERAPIST

## 2024-07-31 PROCEDURE — 97140 MANUAL THERAPY 1/> REGIONS: CPT | Mod: GP | Performed by: PHYSICAL THERAPIST

## 2024-07-31 NOTE — PROGRESS NOTES
07/31/24 0500   Appointment Info   Signing clinician's name / credentials Tammy Fuentes PT   Total/Authorized Visits e&t 12   Visits Used 10   Medical Diagnosis Left ankle ORIF   PT Tx Diagnosis Left ankle ORIF   Quick Adds Certification   Progress Note/Certification   Onset of illness/injury or Date of Surgery 02/24/24   Therapy Frequency 2 x per week x 4 weeks decreasing to 1 x per week x 4 weeks   Predicted Duration 8 weeks   Certification date from 05/31/24   Certification date to 07/26/24   PT Goal 1   Goal Identifier walking   Goal Description Ashkan will be able to walk without assive device with minimal gait abnormality   Rationale to maximize safety and independence within the home;to maximize safety and independence within the community   Goal Progress Good stride and pace compared to previous function, feels more stable and confidant   Target Date 07/26/24   Date Met 07/31/24   Subjective Report   Subjective Report Ashkan feels pretty good overall with function and walking.  Feels more stiffness in the morning, not really any pain.  sometimes a little sore but not painful   Objective Measures   Objective Measures Objective Measure 1;Objective Measure 2;Objective Measure 3   Objective Measure 1   Objective Measure ankle ROM   Details WNL but feels slightly stiff in DF inversion 45, ev 20, DF 10   Objective Measure 2   Objective Measure gait   Details good stride length and pace, no ad   Objective Measure 3   Objective Measure strength   Details can do squat to pick item off floor, can walk on toes with wilbur just off the ground but cannot to single leg toe raise.   Treatment Interventions (PT)   Interventions Therapeutic Procedure/Exercise;Manual Therapy   Therapeutic Procedure/Exercise   Therapeutic Procedures: strength, endurance, ROM, flexibility minutes (42853) 20   Ther Proc 1 PROM left ankle   Ther Proc 1 - Details x 10 ea directions.   PTRx Ther Proc 1 soleus stretch in standing - repetition with talus  "stabilization   PTRx Ther Proc 5 Standing Gastroc Stretch   PTRx Ther Proc 5 - Details 2 x 30s, also standing HS stretch   PTRx Ther Proc 7 toe raise off step x 20   PTRx Ther Proc 8 Squat   PTRx Ther Proc 8 - Details x 15 to chair   PTRx Ther Proc 9 Stepdown Forward   PTRx Ther Proc 9 - Details step up and over 8\" step   PTRx Ther Proc 10 lunge in place x 10 each   PTRx Ther Proc 10 - Details then walking lunges across floor - more challenging   PTRx Ther Proc 11 Bridge #1   PTRx Ther Proc 11 - Details x 10   Skilled Intervention provided cues for form, but looking pretty good with techinque   Patient Response/Progress felt good with movement -better mobility after exercise   Neuromuscular Re-education   Neuromuscular re-ed of mvmt, balance, coord, kinesthetic sense, posture, proprioception minutes (15099) 10   Neuromuscular Re-education Neuro Re-ed 6   Neuro Re-ed 1 alternating toe tap to 8\" step from airex   Neuro Re-ed 1 - Details cues for controlled tap. no UE support   Neuro Re-ed 2 sidestepping in squat   Neuro Re-ed 2 - Details log x 2   Neuro Re-ed 3 walking lunges   Neuro Re-ed 3 - Details log x 2   Neuro Re-ed 4 BAPS board level 2  FWB   Neuro Re-ed 4 - Details needs right to help control side to side and cw/ccw   Neuro Re-ed 5 single leg reach out to chair   Neuro Re-ed 5 - Details needs hands for balance   Neuro Re-ed 6 SLS on floor modified and reg   Neuro Re-ed 6 - Details toe tap to front, back and side   PTRx Neuro Re-ed 1 Semi-Tandem Stance weight shift   PTRx Neuro Re-ed 1 - Details x 30s B on foam weight shift   PTRx Neuro Re-ed 2 forward and backward walking accross floor   Skilled Intervention guided, cues for balance, appropriate challenge   Patient Response/Progress did well with exercises   Manual Therapy   Manual Therapy: Mobilization, MFR, MLD, friction massage minutes (23965) 8   Manual Therapy Manual Therapy 3   Manual Therapy 1 talocrural and subtalar jt mobs   Manual Therapy 1 - " Details gr II   Manual Therapy 2 talocrural distraction with DF   Manual Therapy 2 - Details gr II   Manual Therapy 3 Midfoot and forefoot joint mobs   Manual Therapy 3 - Details gr II   Skilled Intervention mobs for mobility   Patient Response/Progress improved mobility and nearly normal walking after PT   Total Session Time   Timed Code Treatment Minutes 38   Total Treatment Time (sum of timed and untimed services) 38       DISCHARGE  Reason for Discharge: Patient has met all goals.        Discharge Plan: Patient to continue home program.    Referring Provider:  Abisai Milan

## 2024-08-08 ENCOUNTER — OFFICE VISIT (OUTPATIENT)
Dept: CARDIOLOGY | Facility: CLINIC | Age: 69
End: 2024-08-08
Attending: FAMILY MEDICINE
Payer: COMMERCIAL

## 2024-08-08 VITALS
BODY MASS INDEX: 28.68 KG/M2 | HEART RATE: 94 BPM | OXYGEN SATURATION: 99 % | SYSTOLIC BLOOD PRESSURE: 128 MMHG | WEIGHT: 193 LBS | DIASTOLIC BLOOD PRESSURE: 89 MMHG

## 2024-08-08 DIAGNOSIS — I63.00 CEREBRAL INFARCTION DUE TO THROMBOSIS OF PRECEREBRAL ARTERY (H): ICD-10-CM

## 2024-08-08 DIAGNOSIS — I25.10 CORONARY ARTERY CALCIFICATION SEEN ON CAT SCAN: ICD-10-CM

## 2024-08-08 DIAGNOSIS — I77.810 ASCENDING AORTA DILATION (H): ICD-10-CM

## 2024-08-08 DIAGNOSIS — E78.5 HYPERLIPIDEMIA LDL GOAL <100: ICD-10-CM

## 2024-08-08 PROCEDURE — 99204 OFFICE O/P NEW MOD 45 MIN: CPT | Performed by: INTERNAL MEDICINE

## 2024-08-08 NOTE — NURSING NOTE
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    Weight as of this encounter: 87.5 kg (193 lb)..  BP completed using cuff size: regular    AYO Arguello    "

## 2024-08-08 NOTE — PATIENT INSTRUCTIONS
Thank you for coming to the [facility] @ [place]; please note the following instructions:    1. Dr. EDUARDO Romano has ordered a stress echocardiogram to be performed on you.  This test has been scheduled at the [facility] ([address] [place] [address]) on ____ at _____.m.  Please arrive 15 minutes early to allow enough time for registration.  If this appointment should conflict with your schedule, please call [phone] to reschedule.  The Cardiology Nurse will contact you regarding the results of your stress echocardiogram (please see result notification details at bottom of summary). Results will determine next step    *PLEASE REFRAIN FROM USING SCENTED LOTIONS OR PERFUMES    *CAFFIENE FREE 12 HOURS PRIOR TO THE TEST    *NO ALCOHOL, SMOKING OR OTHER TOBACCO FOR 12 HOURS PRIOR TO THE TEST    *STOP EATING 3 HOURS BEFORE THE TEST -YOU MAY DRINK WATER OR JUICE    *WEAR COMFORTABLE 2 PIECE CLOTHING    *AVOID VIAGRA, CIALIS OR LEVITRA 48 HOURS PRIOR TO THE TEST    *PLEASE ALLOW 1 HOUR FOR THE TEST    DURING YOUR TEST    Echo uses a small device that makes sound waves (transducer). It sends images of your heart to a video monitor. The images are recorded:       A special gel is put on your chest. The transducer is moved over the gel. This records your heart at rest. If you are overweight, the technician may have to apply more pressure to get the best quality images. This can be uncomfortable over body areas. Tell your technician if you are uncomfortable.     Your blood pressure is taken. An electrocardiogram (ECG) is also done. This test involves small pads called electrodes. They are placed on your chest. The ECG reads the pattern of your heartbeat.    Next, you're asked to walk on a treadmill or pedal a stationary bike. You'll do this until your heart beats rapidly. Note: If you are not able to exercise, you'll be given a medicine to get your heart working harder,  this medicine can make you feel short of breath.    You stop exercising. Right away, the transducer is used to take a second set of video images of your heart. Your healthcare provider compares these images to the first ones taken.    The technician may use IV contrast to improve the image quality or agitated saline may be used to follow blood flow through the chambers of the heart.    During the test, be sure to tell the healthcare provider if you feel any chest, arm, or jaw discomfort. Also mention if you have severe shortness of breath, feel very tired, or feel dizzy.    The test may be stopped if you have severe or persistent symptoms with exercise or there is any change in your vital signs or heart rhythm.    After the test  You can go back to your normal daily routine. Be sure to keep your follow-up appointment with your healthcare provider to discuss test results.          If you have any questions regarding your visit please contact your care team:     Cardiology  Telephone Number   Chrissie FRANKLIN., RN  Ariella GOFF, RN  Gale LAKE, RN  Lizzie MOJICA, RMDONNA WOODS, AYO HERRING, Visit Facilitator  Daniella MANN [facility] [phone] (option 1)   For scheduling appts:     [phone] (select option 1)       For the Device Clinic (Pacemakers and ICD's)  RN's :  Bree Jaramillo   During business hours: [phone]    *After business hours:  [phone] (select option 4)      Normal test result notifications will be released via Flock or mailed within 7 business days.  All other test results, will be communicated via telephone once reviewed by your cardiologist.    If you need a medication refill please contact your pharmacy.  Please allow 3 business days for your refill to be completed.    As always, thank you for trusting us with your health care needs!

## 2024-08-08 NOTE — PROGRESS NOTES
SUBJECTIVE:  Ashkan Rodriguez is a 69 year old male who presents for cardiac evaluation.  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  In February of this year he was admitted with acute hemiplegia of the left side.  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  Currently patient is recovering and able to ambulate and exercise.  In 2018 patient had a CT calcium score.  Total calcium score was 2141 placing him on the 98th percentile.  According the patient he exercises on the elliptical machine and reached a heart rate of 120 without cardiac symptoms.  No prior cardiac history.  History of hypertension and hyperlipidemia.  Remote history of smoking intermittently.  No diabetes.  No family history of premature coronary artery disease.    Patient Active Problem List    Diagnosis Date Noted    Coronary artery calcification seen on CAT scan 07/01/2024     Priority: Medium    History of thymus cancer 07/01/2024     Priority: Medium    Calculus of gallbladder without cholecystitis without obstruction 07/01/2024     Priority: Medium    Elevated fasting glucose 07/01/2024     Priority: Medium    Benign prostatic hyperplasia without lower urinary tract symptoms 07/01/2024     Priority: Medium    Spastic hemiparesis of left dominant side (H) 07/01/2024     Priority: Medium    Benign essential hypertension 07/01/2024     Priority: Medium    Hyperlipidemia LDL goal <100 07/01/2024     Priority: Medium    S/P ORIF (open reduction internal fixation) fracture, left ankle 07/01/2024     Priority: Medium    Elevated liver enzymes 07/01/2024     Priority: Medium    Cerebral palsy, unspecified type (H) 07/01/2024     Priority: Medium    Ascending aorta dilation (H24), 4.1cm 07/01/2024     Priority: Medium    Cerebral infarction (H)      Priority: Medium    Left ankle injury 05/31/2024     Priority: Medium    .  Current Outpatient Medications   Medication Sig Dispense Refill    amLODIPine (NORVASC)  2.5 MG tablet Take 2.5 mg by mouth daily      aspirin 81 MG EC tablet Take 81 mg by mouth daily      atorvastatin (LIPITOR) 40 MG tablet Take 40 mg by mouth daily      multivitamin w/minerals (THERA-VIT-M) tablet Take 1 tablet by mouth daily      tamsulosin (FLOMAX) 0.4 MG capsule Take 0.4 mg by mouth daily      gabapentin (NEURONTIN) 100 MG capsule Take 1 capsule by mouth at bedtime       No current facility-administered medications for this visit.     Past Medical History:   Diagnosis Date    Cancer (H) 10/01/2001    Cerebral infarction (H) 2/10/2024    Heart disease 2018    Hypertension      Past Surgical History:   Procedure Laterality Date    ACHILLES TENDON SURGERY      1961, cerebral palsy    CARDIAC SURGERY  02/2024    ICM placement, [employer]tronic    ORTHOPEDIC SURGERY  03/29/2024    THYMECTOMY      2001     No Known Allergies  Social History     Socioeconomic History    Marital status: Single     Spouse name: Not on file    Number of children: Not on file    Years of education: Not on file    Highest education level: Not on file   Occupational History    Not on file   Tobacco Use    Smoking status: Never     Passive exposure: Yes    Smokeless tobacco: Never    Tobacco comments:     1/2 pack /year   Vaping Use    Vaping status: Never Used   Substance and Sexual Activity    Alcohol use: Not Currently     Comment: 1-2 beers/day    Drug use: Never    Sexual activity: Not Currently     Partners: Female     Birth control/protection: None   Other Topics Concern    Parent/sibling w/ CABG, MI or angioplasty before 65F 55M? No   Social History Narrative    Not on file     Social Determinants of Health     Financial Resource Strain: Low Risk  (6/28/2024)    Financial Resource Strain     Within the past 12 months, have you or your family members you live with been unable to get utilities (heat, electricity) when it was really needed?: No   Food Insecurity: Low Risk  (6/28/2024)    Food Insecurity     Within the past 12  months, did you worry that your food would run out before you got money to buy more?: No     Within the past 12 months, did the food you bought just not last and you didn t have money to get more?: No   Transportation Needs: Low Risk  (6/28/2024)    Transportation Needs     Within the past 12 months, has lack of transportation kept you from medical appointments, getting your medicines, non-medical meetings or appointments, work, or from getting things that you need?: No   Physical Activity: Not on file   Stress: Not on file   Social Connections: Socially Integrated (2/11/2024)    Received from [employer]    Social Connections     Frequency of Communication with Friends and Family: 0   Interpersonal Safety: Low Risk  (7/1/2024)    Interpersonal Safety     Do you feel physically and emotionally safe where you currently live?: Yes     Within the past 12 months, have you been hit, slapped, kicked or otherwise physically hurt by someone?: No     Within the past 12 months, have you been humiliated or emotionally abused in other ways by your partner or ex-partner?: No   Housing Stability: Low Risk  (6/28/2024)    Housing Stability     Do you have housing? : Yes     Are you worried about losing your housing?: No     Family History   Problem Relation Age of Onset    Hypertension Mother     Hypertension Father     Ulcers Father     Hypertension Sister     Hyperlipidemia Sister     Hypertension Sister     Hypertension Brother     Lung Cancer Brother     Lung Cancer Maternal Grandmother     Cerebrovascular Disease Maternal Grandfather     Coronary Artery Disease Paternal Grandmother           REVIEW OF SYSTEMS:  General: negative, fever, chills, night sweats  Skin: negative, acne, rash, and scaling  Eyes: negative, double vision, eye pain, and photophobia  Ears/Nose/Throat: negative, nasal congestion, and purulent rhinorrhea  Respiratory: No dyspnea on exertion, No cough, No hemoptysis, and  negative  Cardiovascular: negative, palpitations, tachycardia, irregular heart beat, chest pain, exertional chest pain or pressure, paroxysmal nocturnal dyspnea, dyspnea on exertion, and orthopnea         OBJECTIVE:  Blood pressure 128/89, pulse 94, weight 87.5 kg (193 lb), SpO2 99%.  General Appearance: healthy, alert, active, and no distress  Head: Normocephalic. No masses, lesions, tenderness or abnormalities  Eyes: conjuctiva clear, PERRL, EOM intact  Ears: External ears normal. Canals clear. TM's normal.  Nose: Nares normal  Mouth: normal  Neck: Supple, no cervical adenopathy, no thyromegaly  Lungs: clear to auscultation  Cardiac: regular rate and rhythm, normal S1 and S2, no murmur       ASSESSMENT/PLAN:  Patient here for cardiac evaluation due to hypertension, hyperlipidemia, severe coronary artery calcification and a recent stroke in February of this year.  Patient was born with cerebral palsy with minimal manifestations.  February of this year patient had acute left hemiplegia and evaluation found ostial occlusion of right vertebral artery.  Patient was on wheelchair for some time and currently ambulating and to have some numbness on the left side.  Patient had a CT calcium score in 2018.  This showed total score of over 2000.  According the patient he is active and do not have any cardiac symptoms.  Records from outside reviewed.  Patient's echocardiogram showed normal LV function and no significant valvular abnormalities.  Ascending aorta measuring 4.1 cm.  Patient had a Zio patch which did not show any significant abnormality.  Currently patient is wearing a loop recorder and first report showed no evidence for atrial fibrillation.  Due to heavy coronary artery calcification will plan for an exercise stress echo to assess for any significant blockage.  Patient will be contacted with the test results and further plan.  His current cardiac medications are atorvastatin 40 mg daily, amlodipine 2.5 mg daily  and aspirin 81 mg daily.  Total visit duration 45 minutes.  This included face-to-face interview, physical exam, chart review, review of outside records including EKG CT calcium score Zio patch result echocardiogram result and CT scan results and documentation.

## 2024-08-08 NOTE — LETTER
8/8/2024      RE: Ashkan Rodriguez  [address]  [facility] [address]       Dear Colleague,    Thank you for the opportunity to participate in the care of your patient, Ashkan Rodriguez, at the [facility]. Please see a copy of my visit note below.       SUBJECTIVE:  Ashkan Rodriguez is a 69 year old male who presents for cardiac evaluation.  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  In February of this year he was admitted with acute hemiplegia of the left side.  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  Currently patient is recovering and able to ambulate and exercise.  In 2018 patient had a CT calcium score.  Total calcium score was 2141 placing him on the 98th percentile.  According the patient he exercises on the elliptical machine and reached a heart rate of 120 without cardiac symptoms.  No prior cardiac history.  History of hypertension and hyperlipidemia.  Remote history of smoking intermittently.  No diabetes.  No family history of premature coronary artery disease.    Patient Active Problem List    Diagnosis Date Noted     Coronary artery calcification seen on CAT scan 07/01/2024     Priority: Medium     History of thymus cancer 07/01/2024     Priority: Medium     Calculus of gallbladder without cholecystitis without obstruction 07/01/2024     Priority: Medium     Elevated fasting glucose 07/01/2024     Priority: Medium     Benign prostatic hyperplasia without lower urinary tract symptoms 07/01/2024     Priority: Medium     Spastic hemiparesis of left dominant side (H) 07/01/2024     Priority: Medium     Benign essential hypertension 07/01/2024     Priority: Medium     Hyperlipidemia LDL goal <100 07/01/2024     Priority: Medium     S/P ORIF (open reduction internal fixation) fracture, left ankle 07/01/2024     Priority: Medium     Elevated liver enzymes 07/01/2024      Priority: Medium     Cerebral palsy, unspecified type (H) 07/01/2024     Priority: Medium     Ascending aorta dilation (H24), 4.1cm 07/01/2024     Priority: Medium     Cerebral infarction (H)      Priority: Medium     Left ankle injury 05/31/2024     Priority: Medium    .  Current Outpatient Medications   Medication Sig Dispense Refill     amLODIPine (NORVASC) 2.5 MG tablet Take 2.5 mg by mouth daily       aspirin 81 MG EC tablet Take 81 mg by mouth daily       atorvastatin (LIPITOR) 40 MG tablet Take 40 mg by mouth daily       multivitamin w/minerals (THERA-VIT-M) tablet Take 1 tablet by mouth daily       tamsulosin (FLOMAX) 0.4 MG capsule Take 0.4 mg by mouth daily       gabapentin (NEURONTIN) 100 MG capsule Take 1 capsule by mouth at bedtime       No current facility-administered medications for this visit.     Past Medical History:   Diagnosis Date     Cancer (H) 10/01/2001     Cerebral infarction (H) 2/10/2024     Heart disease 2018     Hypertension      Past Surgical History:   Procedure Laterality Date     ACHILLES TENDON SURGERY      1961, cerebral palsy     CARDIAC SURGERY  02/2024    ICM placement, medtronic     ORTHOPEDIC SURGERY  03/29/2024     THYMECTOMY      2001     No Known Allergies  Social History     Socioeconomic History     Marital status: Single     Spouse name: Not on file     Number of children: Not on file     Years of education: Not on file     Highest education level: Not on file   Occupational History     Not on file   Tobacco Use     Smoking status: Never     Passive exposure: Yes     Smokeless tobacco: Never     Tobacco comments:     1/2 pack /year   Vaping Use     Vaping status: Never Used   Substance and Sexual Activity     Alcohol use: Not Currently     Comment: 1-2 beers/day     Drug use: Never     Sexual activity: Not Currently     Partners: Female     Birth control/protection: None   Other Topics Concern     Parent/sibling w/ CABG, MI or angioplasty before 65F 55M? No   Social  History Narrative     Not on file     Social Determinants of Health     Financial Resource Strain: Low Risk  (6/28/2024)    Financial Resource Strain      Within the past 12 months, have you or your family members you live with been unable to get utilities (heat, electricity) when it was really needed?: No   Food Insecurity: Low Risk  (6/28/2024)    Food Insecurity      Within the past 12 months, did you worry that your food would run out before you got money to buy more?: No      Within the past 12 months, did the food you bought just not last and you didn t have money to get more?: No   Transportation Needs: Low Risk  (6/28/2024)    Transportation Needs      Within the past 12 months, has lack of transportation kept you from medical appointments, getting your medicines, non-medical meetings or appointments, work, or from getting things that you need?: No   Physical Activity: Not on file   Stress: Not on file   Social Connections: Socially Integrated (2/11/2024)    Received from [facility]    Social Connections      Frequency of Communication with Friends and Family: 0   Interpersonal Safety: Low Risk  (7/1/2024)    Interpersonal Safety      Do you feel physically and emotionally safe where you currently live?: Yes      Within the past 12 months, have you been hit, slapped, kicked or otherwise physically hurt by someone?: No      Within the past 12 months, have you been humiliated or emotionally abused in other ways by your partner or ex-partner?: No   Housing Stability: Low Risk  (6/28/2024)    Housing Stability      Do you have housing? : Yes      Are you worried about losing your housing?: No     Family History   Problem Relation Age of Onset     Hypertension Mother      Hypertension Father      Ulcers Father      Hypertension Sister      Hyperlipidemia Sister      Hypertension Sister      Hypertension Brother      Lung Cancer Brother      Lung Cancer Maternal Grandmother       Cerebrovascular Disease Maternal Grandfather      Coronary Artery Disease Paternal Grandmother           REVIEW OF SYSTEMS:  General: negative, fever, chills, night sweats  Skin: negative, acne, rash, and scaling  Eyes: negative, double vision, eye pain, and photophobia  Ears/Nose/Throat: negative, nasal congestion, and purulent rhinorrhea  Respiratory: No dyspnea on exertion, No cough, No hemoptysis, and negative  Cardiovascular: negative, palpitations, tachycardia, irregular heart beat, chest pain, exertional chest pain or pressure, paroxysmal nocturnal dyspnea, dyspnea on exertion, and orthopnea         OBJECTIVE:  Blood pressure 128/89, pulse 94, weight 87.5 kg (193 lb), SpO2 99%.  General Appearance: healthy, alert, active, and no distress  Head: Normocephalic. No masses, lesions, tenderness or abnormalities  Eyes: conjuctiva clear, PERRL, EOM intact  Ears: External ears normal. Canals clear. TM's normal.  Nose: Nares normal  Mouth: normal  Neck: Supple, no cervical adenopathy, no thyromegaly  Lungs: clear to auscultation  Cardiac: regular rate and rhythm, normal S1 and S2, no murmur       ASSESSMENT/PLAN:  Patient here for cardiac evaluation due to hypertension, hyperlipidemia, severe coronary artery calcification and a recent stroke in February of this year.  Patient was born with cerebral palsy with minimal manifestations.  February of this year patient had acute left hemiplegia and evaluation found ostial occlusion of right vertebral artery.  Patient was on wheelchair for some time and currently ambulating and to have some numbness on the left side.  Patient had a CT calcium score in 2018.  This showed total score of over 2000.  According the patient he is active and do not have any cardiac symptoms.  Records from outside reviewed.  Patient's echocardiogram showed normal LV function and no significant valvular abnormalities.  Ascending aorta measuring 4.1 cm.  Patient had a Zio patch which did not show any  significant abnormality.  Currently patient is wearing a loop recorder and first report showed no evidence for atrial fibrillation.  Due to heavy coronary artery calcification will plan for an exercise stress echo to assess for any significant blockage.  Patient will be contacted with the test results and further plan.  His current cardiac medications are atorvastatin 40 mg daily, amlodipine 2.5 mg daily and aspirin 81 mg daily.  Total visit duration 45 minutes.  This included face-to-face interview, physical exam, chart review, review of outside records including EKG CT calcium score Zio patch result echocardiogram result and CT scan results and documentation.      Please do not hesitate to contact me if you have any questions/concerns.     Sincerely,     CHANELL Mitchell MD

## 2024-08-15 ENCOUNTER — ANCILLARY PROCEDURE (OUTPATIENT)
Dept: CARDIOLOGY | Facility: CLINIC | Age: 69
End: 2024-08-15
Attending: INTERNAL MEDICINE
Payer: COMMERCIAL

## 2024-08-15 PROCEDURE — 93017 CV STRESS TEST TRACING ONLY: CPT | Performed by: INTERNAL MEDICINE

## 2024-08-15 PROCEDURE — 93350 STRESS TTE ONLY: CPT | Mod: 26 | Performed by: INTERNAL MEDICINE

## 2024-08-15 PROCEDURE — 93321 DOPPLER ECHO F-UP/LMTD STD: CPT | Mod: TC | Performed by: INTERNAL MEDICINE

## 2024-08-15 PROCEDURE — 99207 PR STATISTIC IV PUSH SINGLE INITIAL SUBSTANCE: CPT | Performed by: INTERNAL MEDICINE

## 2024-08-15 PROCEDURE — 93018 CV STRESS TEST I&R ONLY: CPT | Performed by: INTERNAL MEDICINE

## 2024-08-15 PROCEDURE — 93350 STRESS TTE ONLY: CPT | Mod: TC | Performed by: INTERNAL MEDICINE

## 2024-08-15 PROCEDURE — 93325 DOPPLER ECHO COLOR FLOW MAPG: CPT | Mod: 26 | Performed by: INTERNAL MEDICINE

## 2024-08-15 PROCEDURE — 93325 DOPPLER ECHO COLOR FLOW MAPG: CPT | Mod: TC | Performed by: INTERNAL MEDICINE

## 2024-08-15 PROCEDURE — 93352 ADMIN ECG CONTRAST AGENT: CPT | Performed by: INTERNAL MEDICINE

## 2024-08-15 PROCEDURE — 93321 DOPPLER ECHO F-UP/LMTD STD: CPT | Mod: 26 | Performed by: INTERNAL MEDICINE

## 2024-08-15 PROCEDURE — 93016 CV STRESS TEST SUPVJ ONLY: CPT | Performed by: INTERNAL MEDICINE

## 2024-08-15 RX ADMIN — Medication 3 ML: at 13:31

## 2024-09-06 ENCOUNTER — DOCUMENTATION ONLY (OUTPATIENT)
Dept: LAB | Facility: CLINIC | Age: 69
End: 2024-09-06
Payer: COMMERCIAL

## 2024-09-06 DIAGNOSIS — Z12.5 PROSTATE CANCER SCREENING: ICD-10-CM

## 2024-09-06 DIAGNOSIS — Z13.0 SCREENING FOR DEFICIENCY ANEMIA: Primary | ICD-10-CM

## 2024-09-06 DIAGNOSIS — E78.5 HYPERLIPIDEMIA LDL GOAL <100: ICD-10-CM

## 2024-09-06 DIAGNOSIS — I10 BENIGN ESSENTIAL HYPERTENSION: ICD-10-CM

## 2024-09-06 DIAGNOSIS — I25.10 CORONARY ARTERY CALCIFICATION SEEN ON CAT SCAN: ICD-10-CM

## 2024-09-06 NOTE — PROGRESS NOTES
Ashkan Rodriguez has an upcoming lab appointment:    Future Appointments   Date Time Provider Department Center   9/20/2024  7:00 AM [facility] LAB ONLY [facility]   9/24/2024  9:00 AM Rosa Harmon MD [facility]     Patient is scheduled for the following lab(s):     There is no order available. Please review and place either future orders or HMPO (Review of Health Maintenance Protocol Orders), as appropriate.    There are no preventive care reminders to display for this patient.  Cristal Canchola MLT

## 2024-09-20 ENCOUNTER — LAB (OUTPATIENT)
Dept: LAB | Facility: CLINIC | Age: 69
End: 2024-09-20
Payer: COMMERCIAL

## 2024-09-20 DIAGNOSIS — Z12.5 PROSTATE CANCER SCREENING: ICD-10-CM

## 2024-09-20 DIAGNOSIS — I10 BENIGN ESSENTIAL HYPERTENSION: ICD-10-CM

## 2024-09-20 DIAGNOSIS — Z13.0 SCREENING FOR DEFICIENCY ANEMIA: ICD-10-CM

## 2024-09-20 DIAGNOSIS — E78.5 HYPERLIPIDEMIA LDL GOAL <100: ICD-10-CM

## 2024-09-20 LAB
ALBUMIN SERPL BCG-MCNC: 4.4 G/DL (ref 3.5–5.2)
ALP SERPL-CCNC: 70 U/L (ref 40–150)
ALT SERPL W P-5'-P-CCNC: 22 U/L (ref 0–70)
AST SERPL W P-5'-P-CCNC: 26 U/L (ref 0–45)
BILIRUB DIRECT SERPL-MCNC: 0.23 MG/DL (ref 0–0.3)
BILIRUB SERPL-MCNC: 2.4 MG/DL
CREAT UR-MCNC: 154 MG/DL
ERYTHROCYTE [DISTWIDTH] IN BLOOD BY AUTOMATED COUNT: 13.1 % (ref 10–15)
HCT VFR BLD AUTO: 42.4 % (ref 40–53)
HGB BLD-MCNC: 14.3 G/DL (ref 13.3–17.7)
MCH RBC QN AUTO: 31.2 PG (ref 26.5–33)
MCHC RBC AUTO-ENTMCNC: 33.7 G/DL (ref 31.5–36.5)
MCV RBC AUTO: 92 FL (ref 78–100)
MICROALBUMIN UR-MCNC: <12 MG/L
MICROALBUMIN/CREAT UR: NORMAL MG/G{CREAT}
PLATELET # BLD AUTO: 232 10E3/UL (ref 150–450)
PROT SERPL-MCNC: 7.5 G/DL (ref 6.4–8.3)
PSA SERPL DL<=0.01 NG/ML-MCNC: 1.7 NG/ML (ref 0–6.5)
RBC # BLD AUTO: 4.59 10E6/UL (ref 4.4–5.9)
WBC # BLD AUTO: 10.5 10E3/UL (ref 4–11)

## 2024-09-20 PROCEDURE — 36415 COLL VENOUS BLD VENIPUNCTURE: CPT

## 2024-09-20 PROCEDURE — G0103 PSA SCREENING: HCPCS

## 2024-09-20 PROCEDURE — 82570 ASSAY OF URINE CREATININE: CPT

## 2024-09-20 PROCEDURE — 82043 UR ALBUMIN QUANTITATIVE: CPT

## 2024-09-20 PROCEDURE — 85027 COMPLETE CBC AUTOMATED: CPT

## 2024-09-20 PROCEDURE — 80076 HEPATIC FUNCTION PANEL: CPT

## 2024-09-23 ENCOUNTER — APPOINTMENT (OUTPATIENT)
Dept: GENERAL RADIOLOGY | Facility: CLINIC | Age: 69
End: 2024-09-23
Attending: EMERGENCY MEDICINE
Payer: COMMERCIAL

## 2024-09-23 ENCOUNTER — HOSPITAL ENCOUNTER (EMERGENCY)
Facility: CLINIC | Age: 69
Discharge: HOME | End: 2024-09-23
Attending: EMERGENCY MEDICINE | Admitting: EMERGENCY MEDICINE
Payer: COMMERCIAL

## 2024-09-23 VITALS
RESPIRATION RATE: 18 BRPM | DIASTOLIC BLOOD PRESSURE: 92 MMHG | OXYGEN SATURATION: 92 % | SYSTOLIC BLOOD PRESSURE: 121 MMHG | HEART RATE: 87 BPM

## 2024-09-23 DIAGNOSIS — M10.9 ACUTE GOUTY ARTHRITIS: ICD-10-CM

## 2024-09-23 DIAGNOSIS — M79.671 RIGHT FOOT PAIN: ICD-10-CM

## 2024-09-23 DIAGNOSIS — L03.032 CELLULITIS OF SECOND TOE OF LEFT FOOT: ICD-10-CM

## 2024-09-23 LAB
ANION GAP SERPL CALCULATED.3IONS-SCNC: 14 MMOL/L (ref 7–15)
BASOPHILS # BLD AUTO: 0.1 10E3/UL (ref 0–0.2)
BASOPHILS NFR BLD AUTO: 1 %
BUN SERPL-MCNC: 13.6 MG/DL (ref 8–23)
CALCIUM SERPL-MCNC: 9.2 MG/DL (ref 8.8–10.4)
CHLORIDE SERPL-SCNC: 97 MMOL/L (ref 98–107)
CREAT SERPL-MCNC: 1.07 MG/DL (ref 0.67–1.17)
CRP SERPL-MCNC: 68.32 MG/L
EGFRCR SERPLBLD CKD-EPI 2021: 75 ML/MIN/1.73M2
EOSINOPHIL # BLD AUTO: 0.2 10E3/UL (ref 0–0.7)
EOSINOPHIL NFR BLD AUTO: 1 %
ERYTHROCYTE [DISTWIDTH] IN BLOOD BY AUTOMATED COUNT: 12.6 % (ref 10–15)
GLUCOSE SERPL-MCNC: 102 MG/DL (ref 70–99)
HCO3 SERPL-SCNC: 25 MMOL/L (ref 22–29)
HCT VFR BLD AUTO: 43.7 % (ref 40–53)
HGB BLD-MCNC: 15.2 G/DL (ref 13.3–17.7)
HOLD SPECIMEN: NORMAL
HOLD SPECIMEN: NORMAL
IMM GRANULOCYTES # BLD: 0.1 10E3/UL
IMM GRANULOCYTES NFR BLD: 0 %
LYMPHOCYTES # BLD AUTO: 2.2 10E3/UL (ref 0.8–5.3)
LYMPHOCYTES NFR BLD AUTO: 15 %
MCH RBC QN AUTO: 32.4 PG (ref 26.5–33)
MCHC RBC AUTO-ENTMCNC: 34.8 G/DL (ref 31.5–36.5)
MCV RBC AUTO: 93 FL (ref 78–100)
MONOCYTES # BLD AUTO: 1.4 10E3/UL (ref 0–1.3)
MONOCYTES NFR BLD AUTO: 9 %
NEUTROPHILS # BLD AUTO: 10.8 10E3/UL (ref 1.6–8.3)
NEUTROPHILS NFR BLD AUTO: 74 %
NRBC # BLD AUTO: 0 10E3/UL
NRBC BLD AUTO-RTO: 0 /100
PLATELET # BLD AUTO: 278 10E3/UL (ref 150–450)
POTASSIUM SERPL-SCNC: 4.4 MMOL/L (ref 3.4–5.3)
RBC # BLD AUTO: 4.69 10E6/UL (ref 4.4–5.9)
SODIUM SERPL-SCNC: 136 MMOL/L (ref 135–145)
URATE SERPL-MCNC: 6.5 MG/DL (ref 3.4–7)
WBC # BLD AUTO: 14.7 10E3/UL (ref 4–11)

## 2024-09-23 PROCEDURE — 80048 BASIC METABOLIC PNL TOTAL CA: CPT | Performed by: EMERGENCY MEDICINE

## 2024-09-23 PROCEDURE — 96374 THER/PROPH/DIAG INJ IV PUSH: CPT

## 2024-09-23 PROCEDURE — 85004 AUTOMATED DIFF WBC COUNT: CPT | Performed by: EMERGENCY MEDICINE

## 2024-09-23 PROCEDURE — 73620 X-RAY EXAM OF FOOT: CPT | Mod: 50

## 2024-09-23 PROCEDURE — 36415 COLL VENOUS BLD VENIPUNCTURE: CPT | Performed by: EMERGENCY MEDICINE

## 2024-09-23 PROCEDURE — 84550 ASSAY OF BLOOD/URIC ACID: CPT | Performed by: EMERGENCY MEDICINE

## 2024-09-23 PROCEDURE — 250N000011 HC RX IP 250 OP 636: Performed by: EMERGENCY MEDICINE

## 2024-09-23 PROCEDURE — 99284 EMERGENCY DEPT VISIT MOD MDM: CPT | Performed by: EMERGENCY MEDICINE

## 2024-09-23 PROCEDURE — 99284 EMERGENCY DEPT VISIT MOD MDM: CPT | Mod: 25

## 2024-09-23 PROCEDURE — 86140 C-REACTIVE PROTEIN: CPT | Performed by: EMERGENCY MEDICINE

## 2024-09-23 RX ORDER — CEPHALEXIN 500 MG/1
500 CAPSULE ORAL 4 TIMES DAILY
Qty: 28 CAPSULE | Refills: 0 | Status: SHIPPED | OUTPATIENT
Start: 2024-09-23 | End: 2024-09-30

## 2024-09-23 RX ORDER — PREDNISONE 20 MG/1
20 TABLET ORAL DAILY
Qty: 4 TABLET | Refills: 0 | Status: SHIPPED | OUTPATIENT
Start: 2024-09-23

## 2024-09-23 RX ORDER — KETOROLAC TROMETHAMINE 15 MG/ML
15 INJECTION, SOLUTION INTRAMUSCULAR; INTRAVENOUS ONCE
Status: COMPLETED | OUTPATIENT
Start: 2024-09-23 | End: 2024-09-23

## 2024-09-23 RX ADMIN — KETOROLAC TROMETHAMINE 15 MG: 15 INJECTION, SOLUTION INTRAMUSCULAR; INTRAVENOUS at 18:01

## 2024-09-23 ASSESSMENT — COLUMBIA-SUICIDE SEVERITY RATING SCALE - C-SSRS
6. HAVE YOU EVER DONE ANYTHING, STARTED TO DO ANYTHING, OR PREPARED TO DO ANYTHING TO END YOUR LIFE?: NO
1. IN THE PAST MONTH, HAVE YOU WISHED YOU WERE DEAD OR WISHED YOU COULD GO TO SLEEP AND NOT WAKE UP?: NO
2. HAVE YOU ACTUALLY HAD ANY THOUGHTS OF KILLING YOURSELF IN THE PAST MONTH?: NO

## 2024-09-23 ASSESSMENT — ACTIVITIES OF DAILY LIVING (ADL)
ADLS_ACUITY_SCORE: 35

## 2024-09-23 NOTE — ED TRIAGE NOTES
Left neck and shoulder pain started a few days ago, then yesterday started having pain in left leg. Had a stroke in February which affected his left side. Symptoms have subsided and he feels back to his normal state since the stroke     Triage Assessment (Adult)       Row Name 09/23/24 1637          Triage Assessment    Airway WDL WDL        Respiratory WDL    Respiratory WDL WDL        Cardiac WDL    Cardiac WDL WDL

## 2024-09-23 NOTE — ED PROVIDER NOTES
History     Chief Complaint   Patient presents with    Shoulder Pain    Leg Pain     HPI  Ashkan Rodriguez is a 69 year old male with past medical history significant for cerebral infarction back in February affecting left side coronary artery calcification history of thymus cancer BPH hypertension hyperlipidemia elevated liver enzymes cerebral palsy who presents the emergency department complaining of previous neck pain and shoulder pain which is now turned into foot pain bilaterally.  Patient states 2 days ago his neck was sore and aching and he was having a bit of pain in his left shoulder yesterday this went down into his left knee left foot and he is having an aching pain there now is having pain in bilateral feet.  Denies any altered mental status or trauma.  He denies any fevers or chills has not had a headache denies any neck pain has not any chest pain shortness of breath denies any abdominal pain or back pain has not had any bowel or bladder dysfunction denies any calf pain.  Patient states he has no numbness or weakness in extremity chest pain.    Allergies:  No Known Allergies    Problem List:    Patient Active Problem List    Diagnosis Date Noted    Coronary artery calcification seen on CAT scan 07/01/2024     Priority: Medium    History of thymus cancer 07/01/2024     Priority: Medium    Calculus of gallbladder without cholecystitis without obstruction 07/01/2024     Priority: Medium    Elevated fasting glucose 07/01/2024     Priority: Medium    Benign prostatic hyperplasia without lower urinary tract symptoms 07/01/2024     Priority: Medium    Spastic hemiparesis of left dominant side (H) 07/01/2024     Priority: Medium    Benign essential hypertension 07/01/2024     Priority: Medium    Hyperlipidemia LDL goal <100 07/01/2024     Priority: Medium    S/P ORIF (open reduction internal fixation) fracture, left ankle 07/01/2024     Priority: Medium    Elevated liver enzymes 07/01/2024     Priority:  Medium    Cerebral palsy, unspecified type (H) 07/01/2024     Priority: Medium    Ascending aorta dilation (H24), 4.1cm 07/01/2024     Priority: Medium    Cerebral infarction (H)      Priority: Medium    Left ankle injury 05/31/2024     Priority: Medium        Past Medical History:    Past Medical History:   Diagnosis Date    Cancer (H) 10/01/2001    Cerebral infarction (H) 2/10/2024    Heart disease 2018    Hypertension        Past Surgical History:    Past Surgical History:   Procedure Laterality Date    ACHILLES TENDON SURGERY      1961, cerebral palsy    CARDIAC SURGERY  02/2024    ICM placement, medtronic    ORTHOPEDIC SURGERY  03/29/2024    THYMECTOMY      2001       Family History:    Family History   Problem Relation Age of Onset    Hypertension Mother     Hypertension Father     Ulcers Father     Hypertension Sister     Hyperlipidemia Sister     Hypertension Sister     Hypertension Brother     Lung Cancer Brother     Lung Cancer Maternal Grandmother     Cerebrovascular Disease Maternal Grandfather     Coronary Artery Disease Paternal Grandmother        Social History:  Marital Status:  Single [1]  Social History     Tobacco Use    Smoking status: Never     Passive exposure: Yes    Smokeless tobacco: Never    Tobacco comments:     1/2 pack /year   Vaping Use    Vaping status: Never Used   Substance Use Topics    Alcohol use: Not Currently     Comment: 1-2 beers/day    Drug use: Never        Medications:    amLODIPine (NORVASC) 2.5 MG tablet  aspirin 81 MG EC tablet  atorvastatin (LIPITOR) 40 MG tablet  gabapentin (NEURONTIN) 100 MG capsule  multivitamin w/minerals (THERA-VIT-M) tablet  tamsulosin (FLOMAX) 0.4 MG capsule          Review of Systems  As per HPI.  Physical Exam   BP: 124/87  Pulse: 87  Resp: 18  SpO2: 97 %      Physical Exam  Vitals and nursing note reviewed.   Constitutional:       General: He is not in acute distress.     Appearance: Normal appearance. He is not ill-appearing, toxic-appearing  or diaphoretic.   HENT:      Head: Normocephalic and atraumatic.      Nose: Nose normal.      Mouth/Throat:      Mouth: Mucous membranes are moist.      Pharynx: Oropharynx is clear.   Eyes:      Conjunctiva/sclera: Conjunctivae normal.   Cardiovascular:      Rate and Rhythm: Normal rate and regular rhythm.      Pulses: Normal pulses.      Heart sounds: Normal heart sounds. No murmur heard.  Pulmonary:      Effort: Pulmonary effort is normal.      Breath sounds: Normal breath sounds. No stridor. No wheezing or rhonchi.   Abdominal:      General: Abdomen is flat. Bowel sounds are normal. There is no distension.      Palpations: Abdomen is soft.      Tenderness: There is no abdominal tenderness. There is no right CVA tenderness.   Musculoskeletal:         General: No swelling or tenderness. Normal range of motion.      Cervical back: Normal range of motion and neck supple.      Right lower leg: No edema.      Left lower leg: No edema.      Comments: Redness/erythema at the base of the right great toe and also the left second toe.  Mild swelling noted of the left toe and this area is tender to palpation no obvious lesion.  No drainage patient has good movement throughout upper and lower extremities with good plantarflexion dorsiflexion bilaterally.  Pulses sensation symmetrical with good capillary refill.   Skin:     General: Skin is warm and dry.      Findings: No rash.   Neurological:      General: No focal deficit present.      Mental Status: He is alert and oriented to person, place, and time.      Cranial Nerves: No cranial nerve deficit.      Sensory: No sensory deficit.      Motor: No weakness.      Coordination: Coordination normal.      Comments: No drift in upper or lower extremities.   Psychiatric:         Mood and Affect: Mood normal.         ED Course        Procedures              Critical Care time:  none               Results for orders placed or performed during the hospital encounter of 09/23/24 (from  the past 24 hour(s))   CBC with platelets, differential    Narrative    The following orders were created for panel order CBC with platelets, differential.  Procedure                               Abnormality         Status                     ---------                               -----------         ------                     CBC with platelets and d...[[id number]]  Abnormal            Final result                 Please view results for these tests on the individual orders.   Basic metabolic panel   Result Value Ref Range    Sodium 136 135 - 145 mmol/L    Potassium 4.4 3.4 - 5.3 mmol/L    Chloride 97 (L) 98 - 107 mmol/L    Carbon Dioxide (CO2) 25 22 - 29 mmol/L    Anion Gap 14 7 - 15 mmol/L    Urea Nitrogen 13.6 8.0 - 23.0 mg/dL    Creatinine 1.07 0.67 - 1.17 mg/dL    GFR Estimate 75 >60 mL/min/1.73m2    Calcium 9.2 8.8 - 10.4 mg/dL    Glucose 102 (H) 70 - 99 mg/dL   [place] Draw    Narrative    The following orders were created for panel order [place] Draw.  Procedure                               Abnormality         Status                     ---------                               -----------         ------                     Extra Blue Top Tube[[id number]]                              Final result               Extra Red Top Tube[[id number]]                               Final result                 Please view results for these tests on the individual orders.   CBC with platelets and differential   Result Value Ref Range    WBC Count 14.7 (H) 4.0 - 11.0 10e3/uL    RBC Count 4.69 4.40 - 5.90 10e6/uL    Hemoglobin 15.2 13.3 - 17.7 g/dL    Hematocrit 43.7 40.0 - 53.0 %    MCV 93 78 - 100 fL    MCH 32.4 26.5 - 33.0 pg    MCHC 34.8 31.5 - 36.5 g/dL    RDW 12.6 10.0 - 15.0 %    Platelet Count 278 150 - 450 10e3/uL    % Neutrophils 74 %    % Lymphocytes 15 %    % Monocytes 9 %    % Eosinophils 1 %    % Basophils 1 %    % Immature Granulocytes 0 %    NRBCs per 100 WBC 0 <1 /100    Absolute Neutrophils 10.8 (H) 1.6 -  8.3 10e3/uL    Absolute Lymphocytes 2.2 0.8 - 5.3 10e3/uL    Absolute Monocytes 1.4 (H) 0.0 - 1.3 10e3/uL    Absolute Eosinophils 0.2 0.0 - 0.7 10e3/uL    Absolute Basophils 0.1 0.0 - 0.2 10e3/uL    Absolute Immature Granulocytes 0.1 <=0.4 10e3/uL    Absolute NRBCs 0.0 10e3/uL   Extra Blue Top Tube   Result Value Ref Range    Hold Specimen JIC    Extra Red Top Tube   Result Value Ref Range    Hold Specimen JIC    CRP inflammation   Result Value Ref Range    CRP Inflammation 68.32 (H) <5.00 mg/L   Uric acid   Result Value Ref Range    Uric Acid 6.5 3.4 - 7.0 mg/dL   XR Foot Bilateral 2 Views    Narrative    EXAM: XR FOOT BILATERAL 2 VIEWS  LOCATION: [facility]  DATE: 9/23/2024    INDICATION: Redness, swelling base of right great toe and 2nd toe of left foot without any signs of osteomyelitis or other abnormality.  COMPARISON: None.      Impression    IMPRESSION: Localized focal soft tissue thickening along medial aspect of the bilateral 1st MTP joint. Mild-to-moderate right-sided 1st MTP joint arthrosis. No acute displaced fractures identified. Mild scattered IP joint arthrosis. No Lisfranc   subluxation on either side. Prior internal fixation changes left ankle. There is heterotopic ossification along the posterior aspect of the right ankle/distal calf in the Achilles tendon region, chronic in appearance. Atherosclerotic vascular   calcifications.       Medications - No data to display    Assessments & Plan (with Medical Decision Making) records were reviewed including past medical history medications and allergies.  Cardiology visit from 8/8/2024 was reviewed.  Family medicine visit from 7/1/2024 was reviewed.  Basic labs were obtained.  Bilateral foot x-rays were obtained to rule out osteomyelitis or other abnormality.  I independently reviewed this and agree with radiologist findings of no obvious fracture or evidence of osteomyelitis degenerative changes present with some swelling of  the soft tissue right great toe.  Independently viewed and interpreted labs.  Patient's white count was 14.7 hemoglobin 15.2 platelet count 278.  Base metabolic panel without significant abnormality.  Patient's CRP is elevated.  But uric acid is within normal limits.  Patient definitely could have some form of arthritis he does appear to have mild cellulitis of the second toe of the left and possible early gout findings on the right.  Agreed to start him on Keflex and given a short course of prednisone.  He reportedly has a primary care appointment tomorrow and should follow-up with this for further workup of arthritis as he had multiple joint pain which is resolved but now on his feet at this time.  Patient is in agreement this plan at this time.     I have reviewed the nursing notes.    I have reviewed the findings, diagnosis, plan and need for follow up with the patient.           Discharge Medication List as of 9/23/2024  7:47 PM        START taking these medications    Details   cephALEXin (KEFLEX) 500 MG capsule Take 1 capsule (500 mg) by mouth 4 times daily for 7 days., Disp-28 capsule, R-0, Local Print      predniSONE (DELTASONE) 20 MG tablet Take 1 tablet (20 mg) by mouth daily., Disp-4 tablet, R-0, Local Print             Final diagnoses:   Right foot pain   Acute gouty arthritis - possible   Cellulitis of second toe of left foot       9/23/2024   [facility] EMERGENCY DEPT       Cedrice, Juan Atkinson MD  09/24/24 6875

## 2024-09-24 ENCOUNTER — OFFICE VISIT (OUTPATIENT)
Dept: FAMILY MEDICINE | Facility: CLINIC | Age: 69
End: 2024-09-24
Payer: COMMERCIAL

## 2024-09-24 VITALS
SYSTOLIC BLOOD PRESSURE: 110 MMHG | WEIGHT: 190.5 LBS | RESPIRATION RATE: 16 BRPM | TEMPERATURE: 97.8 F | BODY MASS INDEX: 28.22 KG/M2 | DIASTOLIC BLOOD PRESSURE: 78 MMHG | HEART RATE: 84 BPM | HEIGHT: 69 IN | OXYGEN SATURATION: 97 %

## 2024-09-24 DIAGNOSIS — R17 ELEVATED BILIRUBIN: ICD-10-CM

## 2024-09-24 DIAGNOSIS — E78.5 HYPERLIPIDEMIA LDL GOAL <100: ICD-10-CM

## 2024-09-24 DIAGNOSIS — I10 BENIGN ESSENTIAL HYPERTENSION: ICD-10-CM

## 2024-09-24 DIAGNOSIS — M25.50 POLYARTHRALGIA: ICD-10-CM

## 2024-09-24 DIAGNOSIS — I63.00 CEREBRAL INFARCTION DUE TO THROMBOSIS OF PRECEREBRAL ARTERY (H): ICD-10-CM

## 2024-09-24 DIAGNOSIS — M79.671 RIGHT FOOT PAIN: ICD-10-CM

## 2024-09-24 DIAGNOSIS — N40.0 BENIGN PROSTATIC HYPERPLASIA WITHOUT LOWER URINARY TRACT SYMPTOMS: ICD-10-CM

## 2024-09-24 DIAGNOSIS — Z00.00 ENCOUNTER FOR MEDICARE ANNUAL WELLNESS EXAM: Primary | ICD-10-CM

## 2024-09-24 DIAGNOSIS — L03.032 CELLULITIS OF SECOND TOE OF LEFT FOOT: ICD-10-CM

## 2024-09-24 DIAGNOSIS — K80.20 CALCULUS OF GALLBLADDER WITHOUT CHOLECYSTITIS WITHOUT OBSTRUCTION: ICD-10-CM

## 2024-09-24 DIAGNOSIS — I77.810 ASCENDING AORTA DILATION (H): ICD-10-CM

## 2024-09-24 DIAGNOSIS — I25.10 CORONARY ARTERY CALCIFICATION SEEN ON CAT SCAN: ICD-10-CM

## 2024-09-24 DIAGNOSIS — R73.01 ELEVATED FASTING GLUCOSE: ICD-10-CM

## 2024-09-24 LAB
B BURGDOR IGG+IGM SER QL: 0.26
ERYTHROCYTE [SEDIMENTATION RATE] IN BLOOD BY WESTERGREN METHOD: 41 MM/HR (ref 0–20)
LIPASE SERPL-CCNC: 34 U/L (ref 13–60)
RHEUMATOID FACT SERPL-ACNC: <10 IU/ML

## 2024-09-24 PROCEDURE — 85652 RBC SED RATE AUTOMATED: CPT | Performed by: FAMILY MEDICINE

## 2024-09-24 PROCEDURE — 99000 SPECIMEN HANDLING OFFICE-LAB: CPT | Performed by: FAMILY MEDICINE

## 2024-09-24 PROCEDURE — 83690 ASSAY OF LIPASE: CPT | Performed by: FAMILY MEDICINE

## 2024-09-24 PROCEDURE — 36415 COLL VENOUS BLD VENIPUNCTURE: CPT | Performed by: FAMILY MEDICINE

## 2024-09-24 PROCEDURE — 99214 OFFICE O/P EST MOD 30 MIN: CPT | Mod: 25 | Performed by: FAMILY MEDICINE

## 2024-09-24 PROCEDURE — 90662 IIV NO PRSV INCREASED AG IM: CPT | Performed by: FAMILY MEDICINE

## 2024-09-24 PROCEDURE — 91320 SARSCV2 VAC 30MCG TRS-SUC IM: CPT | Performed by: FAMILY MEDICINE

## 2024-09-24 PROCEDURE — 86039 ANTINUCLEAR ANTIBODIES (ANA): CPT | Performed by: FAMILY MEDICINE

## 2024-09-24 PROCEDURE — G0439 PPPS, SUBSEQ VISIT: HCPCS | Performed by: FAMILY MEDICINE

## 2024-09-24 PROCEDURE — 86431 RHEUMATOID FACTOR QUANT: CPT | Performed by: FAMILY MEDICINE

## 2024-09-24 PROCEDURE — 86618 LYME DISEASE ANTIBODY: CPT | Performed by: FAMILY MEDICINE

## 2024-09-24 PROCEDURE — 86038 ANTINUCLEAR ANTIBODIES: CPT | Performed by: FAMILY MEDICINE

## 2024-09-24 PROCEDURE — G0008 ADMIN INFLUENZA VIRUS VAC: HCPCS | Performed by: FAMILY MEDICINE

## 2024-09-24 PROCEDURE — 87476 LYME DIS DNA AMP PROBE: CPT | Mod: 90 | Performed by: FAMILY MEDICINE

## 2024-09-24 PROCEDURE — 90480 ADMN SARSCOV2 VAC 1/ONLY CMP: CPT | Performed by: FAMILY MEDICINE

## 2024-09-24 RX ORDER — AMLODIPINE BESYLATE 2.5 MG/1
2.5 TABLET ORAL EVERY EVENING
Qty: 90 TABLET | Refills: 3 | Status: SHIPPED | OUTPATIENT
Start: 2024-09-24

## 2024-09-24 RX ORDER — TAMSULOSIN HYDROCHLORIDE 0.4 MG/1
0.4 CAPSULE ORAL EVERY EVENING
Qty: 90 CAPSULE | Refills: 3 | Status: SHIPPED | OUTPATIENT
Start: 2024-09-24

## 2024-09-24 RX ORDER — ATORVASTATIN CALCIUM 40 MG/1
40 TABLET, FILM COATED ORAL EVERY EVENING
Qty: 90 TABLET | Refills: 3 | Status: SHIPPED | OUTPATIENT
Start: 2024-09-24

## 2024-09-24 ASSESSMENT — PAIN SCALES - GENERAL: PAINLEVEL: MILD PAIN (2)

## 2024-09-24 NOTE — PATIENT INSTRUCTIONS
Patient Education   Preventive Care Advice   This is general advice given by our system to help you stay healthy. However, your care team may have specific advice just for you. Please talk to your care team about your preventive care needs.  Nutrition  Eat 5 or more servings of fruits and vegetables each day.  Try wheat bread, brown rice and whole grain pasta (instead of white bread, rice, and pasta).  Get enough calcium and vitamin D. Check the label on foods and aim for 100% of the RDA (recommended daily allowance).  Lifestyle  Exercise at least 150 minutes each week  (30 minutes a day, 5 days a week).  Do muscle strengthening activities 2 days a week. These help control your weight and prevent disease.  No smoking.  Wear sunscreen to prevent skin cancer.  Have a dental exam and cleaning every 6 months.  Yearly exams  See your health care team every year to talk about:  Any changes in your health.  Any medicines your care team has prescribed.  Preventive care, family planning, and ways to prevent chronic diseases.  Shots (vaccines)   HPV shots (up to age 26), if you've never had them before.  Hepatitis B shots (up to age 59), if you've never had them before.  COVID-19 shot: Get this shot when it's due.  Flu shot: Get a flu shot every year.  Tetanus shot: Get a tetanus shot every 10 years.  Pneumococcal, hepatitis A, and RSV shots: Ask your care team if you need these based on your risk.  Shingles shot (for age 50 and up)  General health tests  Diabetes screening:  Starting at age 35, Get screened for diabetes at least every 3 years.  If you are younger than age 35, ask your care team if you should be screened for diabetes.  Cholesterol test: At age 39, start having a cholesterol test every 5 years, or more often if advised.  Bone density scan (DEXA): At age 50, ask your care team if you should have this scan for osteoporosis (brittle bones).  Hepatitis C: Get tested at least once in your life.  STIs (sexually  transmitted infections)  Before age 24: Ask your care team if you should be screened for STIs.  After age 24: Get screened for STIs if you're at risk. You are at risk for STIs (including HIV) if:  You are sexually active with more than one person.  You don't use condoms every time.  You or a partner was diagnosed with a sexually transmitted infection.  If you are at risk for HIV, ask about PrEP medicine to prevent HIV.  Get tested for HIV at least once in your life, whether you are at risk for HIV or not.  Cancer screening tests  Cervical cancer screening: If you have a cervix, begin getting regular cervical cancer screening tests starting at age 21.  Breast cancer scan (mammogram): If you've ever had breasts, begin having regular mammograms starting at age 40. This is a scan to check for breast cancer.  Colon cancer screening: It is important to start screening for colon cancer at age 45.  Have a colonoscopy test every 10 years (or more often if you're at risk) Or, ask your provider about stool tests like a FIT test every year or Cologuard test every 3 years.  To learn more about your testing options, visit:   .  For help making a decision, visit:   https://bit.ly/fl87853.  Prostate cancer screening test: If you have a prostate, ask your care team if a prostate cancer screening test (PSA) at age 55 is right for you.  Lung cancer screening: If you are a current or former smoker ages 50 to 80, ask your care team if ongoing lung cancer screenings are right for you.  For informational purposes only. Not to replace the advice of your health care provider. Copyright   2023 [facility]. All rights reserved. Clinically reviewed by the [facility]itions [facility]. [employer] [id number] - REV 01/24.  Learning About Activities of Daily Living  What are activities of daily living?     Activities of daily living (ADLs) are the basic self-care tasks you do every day. These include eating, bathing, dressing,  and moving around.  As you age, and if you have health problems, you may find that it's harder to do some of these tasks. If so, your doctor can suggest ideas that may help.  To measure what kind of help you may need, your doctor will ask how well you are able to do ADLs. Let your doctor know if there are any tasks that you are having trouble doing. This is an important first step to getting help. And when you have the help you need, you can stay as independent as possible.  How will a doctor assess your ADLs?  Asking about ADLs is part of a routine health checkup your doctor will likely do as you age. Your health check might be done in a doctor's office, in your home, or at a hospital. The goal is to find out if you are having any problems that could make it hard to care for yourself or that make it unsafe for you to be on your own.  To measure your ADLs, your doctor will ask how hard it is for you to do routine tasks. Your doctor may also want to know if you have changed the way you do a task because of a health problem. Your doctor may watch how you:  Walk back and forth.  Keep your balance while you stand or walk.  Move from sitting to standing or from a bed to a chair.  Button or unbutton a shirt or sweater.  Remove and put on your shoes.  It's common to feel a little worried or anxious if you find you can't do all the things you used to be able to do. Talking with your doctor about ADLs is a way to make sure you're as safe as possible and able to care for yourself as well as you can. You may want to bring a caregiver, friend, or family member to your checkup. They can help you talk to your doctor.  Follow-up care is a key part of your treatment and safety. Be sure to make and go to all appointments, and call your doctor if you are having problems. It's also a good idea to know your test results and keep a list of the medicines you take.  Current as of: October 24, 2023  Content Version: 14.1    9561-0675  Healthwise, "[employer].   Care instructions adapted under license by your healthcare professional. If you have questions about a medical condition or this instruction, always ask your healthcare professional. [employer] disclaims any warranty or liability for your use of this information.    Preventing Falls: Care Instructions  Injuries and health problems such as trouble walking or poor eyesight can increase your risk of falling. So can some medicines. But there are things you can do to help prevent falls. You can exercise to get stronger. You can also arrange your home to make it safer.    Talk to your doctor about the medicines you take. Ask if any of them increase the risk of falls and whether they can be changed or stopped.   Try to exercise regularly. It can help improve your strength and balance. This can help lower your risk of falling.     Practice fall safety and prevention.    Wear low-heeled shoes that fit well and give your feet good support. Talk to your doctor if you have foot problems that make this hard.  Carry a cellphone or wear a medical alert device that you can use to call for help.  Use stepladders instead of chairs to reach high objects. Don't climb if you're at risk for falls. Ask for help, if needed.  Wear the correct eyeglasses, if you need them.    Make your home safer.    Remove rugs, cords, clutter, and furniture from walkways.  Keep your house well lit. Use night-lights in hallways and bathrooms.  Install and use sturdy handrails on stairways.  Wear nonskid footwear, even inside. Don't walk barefoot or in socks without shoes.    Be safe outside.    Use handrails, curb cuts, and ramps whenever possible.  Keep your hands free by using a shoulder bag or backpack.  Try to walk in well-lit areas. Watch out for uneven ground, changes in pavement, and debris.  Be careful in the winter. Walk on the grass or gravel when sidewalks are slippery. Use de-icer on steps and walkways.  "Add non-slip devices to shoes.    Put grab bars and nonskid mats in your shower or tub and near the toilet. Try to use a shower chair or bath bench when bathing.   Get into a tub or shower by putting in your weaker leg first. Get out with your strong side first. Have a phone or medical alert device in the bathroom with you.   Where can you learn more?  Go to https://www.[employer]net/patiented  Enter G117 in the search box to learn more about \"Preventing Falls: Care Instructions.\"  Current as of: July 17, 2023               Content Version: 14.0    5618-3685 [employer].   Care instructions adapted under license by your healthcare professional. If you have questions about a medical condition or this instruction, always ask your healthcare professional. [employer] disclaims any warranty or liability for your use of this information.         "

## 2024-09-24 NOTE — PROGRESS NOTES
Preventive Care Visit  [facility]  Rosa Harmon MD, Family Medicine  Sep 24, 2024      Assessment & Plan     Encounter for Medicare annual wellness exam  : Discussed on regular exercises, healthy eating,  and routine dental checks.      Polyarthralgia  With acute onset of joint stiffness and multiple joint pains including shoulder blades, knees, feet, suspicion for PMR is likely  Will get labs for further evaluation  Will f/u on results and call with recommendations.    - ESR: Erythrocyte sedimentation rate; Future  - Rheumatoid factor; Future  - LYME DISEASE TOTAL ANTIBODIES WITH REFLEX TO CONFIRMATION; Future  - Lyme disease DNA detection by PCR; Future  - ESR: Erythrocyte sedimentation rate  - Rheumatoid factor  - LYME DISEASE TOTAL ANTIBODIES WITH REFLEX TO CONFIRMATION  - Lyme disease DNA detection by PCR    Right foot pain  Reviewed ER visit note, bilateral feet x-ray showing soft tissue swelling and mild arthrosis  Reviewed labs showing significantly elevated CRP   no sed rate was done  Normal uric acid is reviewed  Patient is on prednisone 20 mg once daily for 4 days that was given yesterday in the emergency room.  Will monitor the labs as mentioned above for further evaluation      Cellulitis of second toe of left foot  Patient is on cephalexin that was started in the ER yesterday  RTC in 1week if no better by then or sooner prn.      Elevated bilirubin  Liver Function Studies -   Recent Labs   Lab Test 09/20/24  0703   PROTTOTAL 7.5   ALBUMIN 4.4   BILITOTAL 2.4*   ALKPHOS 70   AST 26   ALT 22     Reviewed repeat LFT showing normal functions except for elevated bilirubin that is slightly worse from before  We will get a lipase and abdominal ultrasound for further evaluation  - Lipase; Future  - US Abdomen Complete; Future  - Lipase    Benign essential hypertension  BP Readings from Last 6 Encounters:   09/24/24 110/78   09/23/24 (!) 121/92   08/08/24 128/89   07/08/24 120/78    07/01/24 111/75     Blood pressure is at goal, continue current dose of amlodipine 2.5 mg daily, low-salt diet, regular exercises, recheck in 1 year or sooner if needed  - amLODIPine (NORVASC) 2.5 MG tablet; Take 1 tablet (2.5 mg) by mouth every evening.    Hyperlipidemia LDL goal <100  LDL Cholesterol Calculated   Date Value Ref Range Status   07/01/2024 41 <=100 mg/dL Final     LDL is at goal, continue current dose of Lipitor, healthy eating, regular exercises, recheck in 1 year.  - atorvastatin (LIPITOR) 40 MG tablet; Take 1 tablet (40 mg) by mouth every evening.    Benign prostatic hyperplasia without lower urinary tract symptoms  Prostate Specific Antigen Screen   Date Value Ref Range Status   09/20/2024 1.70 0.00 - 6.50 ng/mL Final     Continue with Flomax for symptom relief, refills given today  - tamsulosin (FLOMAX) 0.4 MG capsule; Take 1 capsule (0.4 mg) by mouth every evening.    Cerebral infarction due to thrombosis of precerebral artery (H)  Continue to control underlying hypertension, lipids, continue with statin, baby aspirin    Elevated fasting glucose  Lab Results   Component Value Date    A1C 5.7 07/01/2024     Glucose   Date Value Ref Range Status   09/23/2024 102 (H) 70 - 99 mg/dL Final   07/01/2024 96 70 - 99 mg/dL Final   Reviewed slightly elevated fasting blood sugar and A1c consistent with prediabetes  Continue with healthy eating, regular exercises, recheck in 1 year    Ascending aorta dilation (H24)  Reviewed echo showing 4.1 cm ascending aortic dilation  Recheck in 1 year along with cardiology follow-up in 1 year    Coronary artery calcification seen on CAT scan  Continue with baby aspirin, control underlying hypertension, lipids    Calculus of gallbladder without cholecystitis without obstruction  From patient's history   he is not aware of any recent ultrasounds done  I could not see any ultrasound done in the past 5+ years through Care Everywhere  Given the elevated bilirubin,  recommended to proceed with ultrasound              Counseling  Appropriate preventive services were addressed with this patient via screening, questionnaire, or discussion as appropriate for fall prevention, nutrition, physical activity, Tobacco-use cessation, social engagement, weight loss and cognition.  Checklist reviewing preventive services available has been given to the patient.  Reviewed patient's diet, addressing concerns and/or questions.   He is at risk for lack of exercise and has been provided with information to increase physical activity for the benefit of his well-being.   He is at risk for psychosocial distress and has been provided with information to reduce risk.   Patient reported safety concerns were addressed today.    Chart documentation done in part with Dragon Voice recognition Software. Although reviewed after completion, some word and grammatical error may remain.    See Patient Instructions    Subjective   Ashkan is a 69 year old, presenting for the following:  Physical and Annual Visit (ER visit last evening for foot pain)        9/24/2024     8:40 AM   Additional Questions   Roomed by Diane Lynne Schoenherr RN         Health Care Directive  Patient does not have a Health Care Directive or Living Will: Patient states has Advance Directive and will bring in a copy to clinic.    HPI      Hyperlipidemia Follow-Up    Are you regularly taking any medication or supplement to lower your cholesterol?   lipitor  Are you having muscle aches or other side effects that you think could be caused by your cholesterol lowering medication?  No    Hypertension Follow-up    Do you check your blood pressure regularly outside of the clinic? No   Are you following a low salt diet? Yes  Are your blood pressures ever more than 140 on the top number (systolic) OR more   than 90 on the bottom number (diastolic), for example 140/90? N/a    Vascular Disease Follow-up    How often do you take nitroglycerin?  Never  Do you take an aspirin every day? Yes    Cerebrovascular Follow-up    Patient history: ischemic cerebrovascular incident  Residual symptoms: LUE spasticity  Worsened or new symptoms since last visit: No  Daily aspirin use: Yes  Hypertension controlled: Yes          9/19/2024   General Health   How would you rate your overall physical health? Good   Feel stress (tense, anxious, or unable to sleep) Only a little      (!) STRESS CONCERN      9/19/2024   Nutrition   Diet: Regular (no restrictions)            9/19/2024   Exercise   Days per week of moderate/strenous exercise 3 days   Average minutes spent exercising at this level 30 min            9/19/2024   Social Factors   Frequency of gathering with friends or relatives Twice a week   Worry food won't last until get money to buy more No   Food not last or not have enough money for food? No   Do you have housing? (Housing is defined as stable permanent housing and does not include staying ouside in a car, in a tent, in an abandoned building, in an overnight shelter, or couch-surfing.) Yes   Are you worried about losing your housing? No   Lack of transportation? No   Unable to get utilities (heat,electricity)? No            9/24/2024   Fall Risk   Fallen 2 or more times in the past year? No   Trouble with walking or balance? Yes   Gait Speed Test (Document in seconds) 8   Gait Speed Test Interpretation Greater than 5.01 seconds - ABNORMAL             9/19/2024   Activities of Daily Living- Home Safety   Needs help with the following daily activites None of the above   Safety concerns in the home Throw rugs in the hallway            9/19/2024   Dental   Dentist two times every year? Yes            9/19/2024   Hearing Screening   Hearing concerns? None of the above            9/19/2024   Driving Risk Screening   Patient/family members have concerns about driving No            9/19/2024   General Alertness/Fatigue Screening   Have you been more tired than usual  lately? No            9/19/2024   Urinary Incontinence Screening   Bothered by leaking urine in past 6 months No            9/19/2024   TB Screening   Were you born outside of the [place]? No            Today's PHQ-2 Score:       9/23/2024     7:23 AM   PHQ-2 ( 1999 [employer])   Q1: Little interest or pleasure in doing things 0   Q2: Feeling down, depressed or hopeless 0   PHQ-2 Score 0   Q1: Little interest or pleasure in doing things Not at all   Q2: Feeling down, depressed or hopeless Not at all   PHQ-2 Score 0           9/19/2024   Substance Use   Alcohol more than 3/day or more than 7/wk No   Do you have a current opioid prescription? No   How severe/bad is pain from 1 to 10? 0/10 (No Pain)   Do you use any other substances recreationally? No        Social History     Tobacco Use    Smoking status: Never     Passive exposure: Yes    Smokeless tobacco: Never    Tobacco comments:     1/2 pack /year   Vaping Use    Vaping status: Never Used   Substance Use Topics    Alcohol use: Not Currently     Comment: 1-2 beers/day    Drug use: Never           9/19/2024   AAA Screening   Family history of Abdominal Aortic Aneurysm (AAA)? (!) YES       ASCVD Risk   The ASCVD Risk score (Raffy HERNÁNDEZ, et al., 2019) failed to calculate for the following reasons:    The patient has a prior MI or stroke diagnosis            Reviewed and updated as needed this visit by Provider                    Past Medical History:   Diagnosis Date    Cancer (H) 10/01/2001    Cerebral infarction (H) 2/10/2024    Heart disease 2018    Hypertension      Past Surgical History:   Procedure Laterality Date    ACHILLES TENDON SURGERY      1961, cerebral palsy    CARDIAC SURGERY  02/2024    ICM placement, medtronic    ORTHOPEDIC SURGERY  03/29/2024    THYMECTOMY      2001     Lab work is in process  Labs reviewed in EPIC  BP Readings from Last 3 Encounters:   09/24/24 110/78   09/23/24 (!) 121/92   08/08/24 128/89    Wt Readings from Last 3 Encounters:    09/24/24 86.4 kg (190 lb 8 oz)   08/08/24 87.5 kg (193 lb)   07/01/24 85.9 kg (189 lb 6 oz)                  Patient Active Problem List   Diagnosis    Left ankle injury    Cerebral infarction (H)    Coronary artery calcification seen on CAT scan    History of thymus cancer    Calculus of gallbladder without cholecystitis without obstruction    Elevated fasting glucose    Benign prostatic hyperplasia without lower urinary tract symptoms    Spastic hemiparesis of left dominant side (H)    Benign essential hypertension    Hyperlipidemia LDL goal <100    S/P ORIF (open reduction internal fixation) fracture, left ankle    Elevated liver enzymes    Cerebral palsy, unspecified type (H)    Ascending aorta dilation (H24), 4.1cm    Acute gouty arthritis     Past Surgical History:   Procedure Laterality Date    ACHILLES TENDON SURGERY      1961, cerebral palsy    CARDIAC SURGERY  02/2024    ICM placement, medtronic    ORTHOPEDIC SURGERY  03/29/2024    THYMECTOMY      2001       Social History     Tobacco Use    Smoking status: Never     Passive exposure: Yes    Smokeless tobacco: Never    Tobacco comments:     1/2 pack /year   Substance Use Topics    Alcohol use: Not Currently     Comment: 1-2 beers/day     Family History   Problem Relation Age of Onset    Hypertension Mother     Hypertension Father     Ulcers Father     Hypertension Sister     Hyperlipidemia Sister     Hypertension Sister     Hypertension Brother     Lung Cancer Brother     Lung Cancer Maternal Grandmother     Cerebrovascular Disease Maternal Grandfather     Coronary Artery Disease Paternal Grandmother          Current Outpatient Medications   Medication Sig Dispense Refill    amLODIPine (NORVASC) 2.5 MG tablet Take 1 tablet (2.5 mg) by mouth every evening. 90 tablet 3    aspirin 81 MG EC tablet Take 81 mg by mouth daily      atorvastatin (LIPITOR) 40 MG tablet Take 1 tablet (40 mg) by mouth every evening. 90 tablet 3    cephALEXin (KEFLEX) 500 MG capsule  Take 1 capsule (500 mg) by mouth 4 times daily for 7 days. 28 capsule 0    multivitamin w/minerals (THERA-VIT-M) tablet Take 1 tablet by mouth daily      predniSONE (DELTASONE) 20 MG tablet Take 1 tablet (20 mg) by mouth daily. 4 tablet 0    tamsulosin (FLOMAX) 0.4 MG capsule Take 1 capsule (0.4 mg) by mouth every evening. 90 capsule 3     No Known Allergies  Recent Labs   Lab Test 09/23/24  1643 09/20/24  0703 07/01/24  1412   A1C  --   --  5.7*   LDL  --   --  41   HDL  --   --  57   TRIG  --   --  101   ALT  --  22 28   CR 1.07  --  0.99   GFRESTIMATED 75  --  82   POTASSIUM 4.4  --  4.1      Current providers sharing in care for this patient include:  Patient Care Team:  Rosa Harmon MD as PCP - General (Family Medicine)  Nat Garcia MD as MD (Neurology)  CHANELL Mitchell MD as MD (Cardiovascular Disease)  Rosa Harmon MD as Assigned PCP  Nat Garcia MD as Assigned Neuroscience Provider  CHANELL Mitchell MD as Assigned Heart and Vascular Provider    The following health maintenance items are reviewed in Epic and correct as of today:  Health Maintenance   Topic Date Due    RSV VACCINE (1 - Risk 60-74 years 1-dose series) 07/01/2025 (Originally 7/13/2014)    COVID-19 Vaccine (7 - 2024-25 season) 11/19/2024    LIPID  07/01/2025    ANNUAL REVIEW OF HM ORDERS  07/01/2025    BMP  09/23/2025    MEDICARE ANNUAL WELLNESS VISIT  09/24/2025    FALL RISK ASSESSMENT  09/24/2025    ADVANCE CARE PLANNING  09/24/2029    DTAP/TDAP/TD IMMUNIZATION (5 - Td or Tdap) 06/01/2032    COLORECTAL CANCER SCREENING  07/01/2032    HEPATITIS C SCREENING  Completed    PHQ-2 (once per calendar year)  Completed    INFLUENZA VACCINE  Completed    Pneumococcal Vaccine: 65+ Years  Completed    ZOSTER IMMUNIZATION  Completed    HPV IMMUNIZATION  Aged Out    MENINGITIS IMMUNIZATION  Aged Out    RSV MONOCLONAL ANTIBODY  Aged Out    GLUCOSE  Discontinued         Review of Systems  CONSTITUTIONAL: NEGATIVE for fever,  "chills, change in weight  INTEGUMENTARY/SKIN: as above  EYES: NEGATIVE for vision changes or irritation  ENT/MOUTH: NEGATIVE for ear, mouth and throat problems  RESP: NEGATIVE for significant cough or SOB  CV: h/o HTN, CAD  GI: NEGATIVE for nausea, abdominal pain, heartburn, or change in bowel habits  : NEGATIVE for frequency, dysuria, or hematuria   male :H/O BPH  MUSCULOSKELETAL:as above  NEURO: Hx CVA  ENDOCRINE: NEGATIVE for temperature intolerance, skin/hair changes  HEME: NEGATIVE for bleeding problems  PSYCHIATRIC: NEGATIVE for changes in mood or affect     Objective    Exam  /78 (BP Location: Right arm, Patient Position: Sitting, Cuff Size: Adult Large)   Pulse 84   Temp 97.8  F (36.6  C) (Oral)   Resp 16   Ht 1.745 m (5' 8.7\")   Wt 86.4 kg (190 lb 8 oz)   SpO2 97%   BMI 28.38 kg/m     Estimated body mass index is 28.38 kg/m  as calculated from the following:    Height as of this encounter: 1.745 m (5' 8.7\").    Weight as of this encounter: 86.4 kg (190 lb 8 oz).    Physical Exam  GENERAL: alert and no distress  EYES: Eyes grossly normal to inspection, PERRL and conjunctivae and sclerae normal  HENT: ear canals and TM's normal, nose and mouth without ulcers or lesions  NECK: no adenopathy, no asymmetry, masses, or scars  RESP: lungs clear to auscultation - no rales, rhonchi or wheezes  CV: regular rate and rhythm, normal S1 S2, no S3 or S4, no murmur, click or rub, no peripheral edema  ABDOMEN: soft, nontender, no hepatosplenomegaly, no masses and bowel sounds normal  MS: Moderate tenderness over the dorsum of the right foot which is slightly swollen  Antalgic gait  Left foot-bruise to left third toe from stopping  Erythematous tender swollen left second toe  SKIN: as above  NEURO: Normal strength and tone, mentation intact and speech normal  PSYCH: mentation appears normal, affect normal/bright        9/24/2024   Mini Cog   Clock Draw Score 2 Normal   3 Item Recall 3 objects recalled   Mini " Cog Total Score 5                 Signed Electronically by: Rosa Harmon MD

## 2024-09-24 NOTE — PROGRESS NOTES
Prior to immunization administration, verified patients identity using patient s name and date of birth. Please see Immunization Activity for additional information.     Screening Questionnaire for Adult Immunization    Are you sick today?   No   Do you have allergies to medications, food, a vaccine component or latex?   No   Have you ever had a serious reaction after receiving a vaccination?   No   Do you have a long-term health problem with heart, lung, kidney, or metabolic disease (e.g., diabetes), asthma, a blood disorder, no spleen, complement component deficiency, a cochlear implant, or a spinal fluid leak?  Are you on long-term aspirin therapy?   No   Do you have cancer, leukemia, HIV/AIDS, or any other immune system problem?   No   Do you have a parent, brother, or sister with an immune system problem?   No   In the past 3 months, have you taken medications that affect  your immune system, such as prednisone, other steroids, or anticancer drugs; drugs for the treatment of rheumatoid arthritis, Crohn s disease, or psoriasis; or have you had radiation treatments?   Yes   Have you had a seizure, or a brain or other nervous system problem?   No   During the past year, have you received a transfusion of blood or blood    products, or been given immune (gamma) globulin or antiviral drug?   No   For women: Are you pregnant or is there a chance you could become       pregnant during the next month?   No   Have you received any vaccinations in the past 4 weeks?   No     Immunization questionnaire was positive for at least one answer.  Notified Dr. Harmon.      Patient instructed to remain in clinic for 15 minutes afterwards, and to report any adverse reactions.     Screening performed by Cassia Rahman MA on 9/24/2024 at 9:26 AM.

## 2024-09-24 NOTE — DISCHARGE INSTRUCTIONS
Symptoms worsen or new symptoms develop.  Follow-up with your primary care later this week for recheck.  Take Keflex as directed take prednisone as directed.  If increased pain redness swelling or other symptoms return please return for further evaluation and care.

## 2024-09-25 ENCOUNTER — ANCILLARY PROCEDURE (OUTPATIENT)
Dept: ULTRASOUND IMAGING | Facility: CLINIC | Age: 69
End: 2024-09-25
Attending: FAMILY MEDICINE
Payer: COMMERCIAL

## 2024-09-25 DIAGNOSIS — R17 ELEVATED BILIRUBIN: ICD-10-CM

## 2024-09-25 PROCEDURE — 76700 US EXAM ABDOM COMPLETE: CPT | Mod: TC | Performed by: RADIOLOGY

## 2024-09-25 NOTE — RESULT ENCOUNTER NOTE
Darryl Luther,  Your recent ultrasound showed fatty liver changes and gallbladder stones, both of which can cause elevated bilirubin.  If you have any concerns of abdominal pain, nausea, vomiting as we discussed yesterday, we would consider surgery consult then.  Please continue with your efforts on weight loss, regular exercises and eat a diet free of sugar, avoid alcohol.  I am still waiting for the completion of all lab results and further recommendations on your symptoms from yesterday.   Let me know if you have any questions. Take care.  Rosa Harmon MD

## 2024-09-26 LAB — B BURGDOR DNA SPEC QL NAA+PROBE: NOT DETECTED

## 2024-09-30 ENCOUNTER — TELEPHONE (OUTPATIENT)
Dept: FAMILY MEDICINE | Facility: CLINIC | Age: 69
End: 2024-09-30
Payer: COMMERCIAL

## 2024-09-30 LAB
ANA PAT SER IF-IMP: ABNORMAL
ANA SER QL IF: ABNORMAL
ANA TITR SER IF: ABNORMAL {TITER}

## 2024-09-30 NOTE — TELEPHONE ENCOUNTER
Please call patient with lab results-  1.  Please check to see how he is doing with his foot pain and toe pain and swelling  2.  His labs were negative for Lyme disease and rheumatoid  He is inflammation marker, sed rate is slightly elevated as well as the HOME-lupus screening the slightly elevated lupus screening test is very nonspecific  3.  If he continues to have shoulder blade and foot pain, I would recommend rheumatology consult-let me know

## 2024-10-01 NOTE — TELEPHONE ENCOUNTER
Patient returned call to clinic. Reviewed provider message and results, as noted below.    1) Patient has improved, is feeling much better now after completing the prescribed medications. Almost back to normal, has some mild soreness remaining in one of his toes but otherwise, is feeling well and almost back to baseline..    2) Patient agreed, no questions about results at this time.    3) patient agreed to continue to monitor and if sx's return or worsen suddenly, will contact office for next steps and possible Rheumatology referral. Doing well at this time, much improved.      Routing to provider as update ONLY, okay to review and close encounter if nothing further is needed.        Annemarie Silva RN  Clinical Triage/Primary Care  [facility]

## 2024-11-18 ENCOUNTER — TELEPHONE (OUTPATIENT)
Dept: NEUROLOGY | Facility: CLINIC | Age: 69
End: 2024-11-18
Payer: COMMERCIAL

## 2024-11-18 NOTE — TELEPHONE ENCOUNTER
Left Voicemail (1st Attempt) for the patient to call back and schedule the following:    Location:  Neurology  Appointment type: Return Stroke  Appointment mode In person  Return date: First available - January '25    Specialty phone number: [phone]    Is Imaging Needed: n  Imaging Phone Number to provide to patient: NA    Additional Notes: Patient needs a 6 month follow up scheduled by end of January 2025.

## 2025-02-17 ENCOUNTER — OFFICE VISIT (OUTPATIENT)
Dept: NEUROLOGY | Facility: CLINIC | Age: 70
End: 2025-02-17
Payer: COMMERCIAL

## 2025-02-17 VITALS — OXYGEN SATURATION: 97 % | SYSTOLIC BLOOD PRESSURE: 149 MMHG | DIASTOLIC BLOOD PRESSURE: 71 MMHG | HEART RATE: 75 BPM

## 2025-02-17 DIAGNOSIS — I65.01 OCCLUSION OF RIGHT VERTEBRAL ARTERY: ICD-10-CM

## 2025-02-17 DIAGNOSIS — E78.5 DYSLIPIDEMIA: ICD-10-CM

## 2025-02-17 DIAGNOSIS — I69.30 LATE EFFECT OF ISCHEMIC CEREBRAL STROKE: Primary | ICD-10-CM

## 2025-02-17 DIAGNOSIS — I10 ESSENTIAL HYPERTENSION: ICD-10-CM

## 2025-02-17 DIAGNOSIS — G80.9 CEREBRAL PALSY, UNSPECIFIED TYPE (H): ICD-10-CM

## 2025-02-17 NOTE — NURSING NOTE
"Ashkan Rodriguez is a 69 year old male who presents for:  Chief Complaint   Patient presents with    Stroke        Initial Vitals:  BP (!) 149/71   Pulse 75   SpO2 97%  Estimated body mass index is 28.38 kg/m  as calculated from the following:    Height as of 9/24/24: 1.745 m (5' 8.7\").    Weight as of 9/24/24: 86.4 kg (190 lb 8 oz).. There is no height or weight on file to calculate BSA. BP completed using cuff size: zeny Saldivar    "

## 2025-02-17 NOTE — LETTER
2/17/2025      Ashkan Rodriguez  [address]  [facility] [address]      Dear Colleague,    Thank you for referring your patient, Ashkan Rodriguez, to the [facility]UR[facility]. Please see a copy of my visit note below.      _____________________________________________________________    [facility]    at [facility] [phone]  _____________________________________________________________      Chief Complaint: follow up after Stroke      History of Present Illness: Ashkan Rodriguez is a 69 year old left handed man with history of CP (weaker smaller right UE and LE and left face) who had a stroke in Feb 2024. His stroke caused left sided weakness. MRI showed 2 infarcts, one in the right thalamus and one in the right temporal lobe. 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 Stroke etiology was unclear. He was put on   DAPT and loop recorder was implanted in April 2024 after non invasive monitoring was negative. Of note, the LDL was 31 and A1c was 5.7% and the MRA did not show atherosclerotic stenosis. He had been on ASA 81 and I kept him on that and ordered CTA head and neck to assess the non-stenotic atherosclerotic burden.     CTA head and neck was done in July 2024 and I personally reviewed. The right vert was occluded at its origin. There was minimal atherosclerotic burden of the aortic arch and large vessels of the head and neck.     Patient is here today for follow up. He is unaccompanied.   Denies any new stroke or TIA symptoms.   Reports residual left arm tightness, left hand weakness. He is left handed fine movements affected.   No functional impairment.   Checks BP at home. Usually 120-130/80s.   Tries to eat healthy.   Does not smoke.     mRS 1.         Diagnosis:  Problem List Items Addressed This Visit    None      Impression & Plan:     As discussed with the patient    Diagnosis:   History of stroke a year ago  "involving the right thalamus and the right temporal lobe.   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 I ordered a CTA which showed the same finding. I am not sure if the vertebral artery occlusion led to the stroke or it had been occluded before that (for example related to birth trauma).   You have a heart monitor device implanted and it has not shown any atrial fibrillation yet.     Plan:   Continue on the aspirin and other medications. No changes today. This regimen is the appropriate one for stroke prevention if the underlying cause is plaque build up in the arteries.   If your heart monitor shows any atrial fibrillation, we will switch from aspirin to one of the stronger blood thinners (pradaxa, xarelto, eliquis) which have better protection from stroke in patients with atrial fibrillation.     Follow up with me in 1 year. Call earlier if you need anything.     Stroke Education provided.  He will call us with any questions.  For any acute neurologic deficits he was advised to  go directly to the hospital rather than call the clinic.    Nat Garcia MD, Msc, MYRNA EASON   of Neurology  [facility]     02/17/2025 1:10 PM  To page me or covering stroke neurology team member, click here: AMCOM  Choose \"On Call\" tab at top, then search dropdown box for \"Neurology Adult\" & press Enter, look for Neuro ICU/Stroke    ___________________________________________________________________    Current Medications  Current Outpatient Medications   Medication Sig Dispense Refill     amLODIPine (NORVASC) 2.5 MG tablet Take 1 tablet (2.5 mg) by mouth every evening. 90 tablet 3     aspirin 81 MG EC tablet Take 81 mg by mouth daily       atorvastatin (LIPITOR) 40 MG tablet Take 1 tablet (40 mg) by mouth every evening. 90 tablet 3     multivitamin w/minerals (THERA-VIT-M) tablet Take 1 tablet by mouth daily       tamsulosin " (FLOMAX) 0.4 MG capsule Take 1 capsule (0.4 mg) by mouth every evening. 90 capsule 3     predniSONE (DELTASONE) 20 MG tablet Take 1 tablet (20 mg) by mouth daily. (Patient not taking: Reported on 2/17/2025) 4 tablet 0     No current facility-administered medications for this visit.       Past Medical History  Past Medical History:   Diagnosis Date     Cancer (H) 10/01/2001     Cerebral infarction (H) 2/10/2024     Heart disease 2018     Hypertension        Social History  Social History     Tobacco Use     Smoking status: Never     Passive exposure: Yes     Smokeless tobacco: Never     Tobacco comments:     1/2 pack /year   Vaping Use     Vaping status: Never Used   Substance Use Topics     Alcohol use: Not Currently     Comment: 1-2 beers/day     Drug use: Never       Family History  Family History   Problem Relation Age of Onset     Hypertension Mother      Hypertension Father      Ulcers Father      Hypertension Sister      Hyperlipidemia Sister      Hypertension Sister      Hypertension Brother      Lung Cancer Brother      Lung Cancer Maternal Grandmother      Cerebrovascular Disease Maternal Grandfather      Coronary Artery Disease Paternal Grandmother        Physical Exam    BP (!) 149/71   Pulse 75   SpO2 97%     General Exam:  GEN:  Awake, NAD  HEAD:  Atraumatic/Normocephalic  EYES:  Non-icterus, no conjunctivitis  EARS:  No otic discharge  NOSE:  Patent nares, no discharge  THROAT:  MMM, No oral lesions  CV:  RRR  PULM:  Breathing comfortably on room air, no tachypnea, not using accessory muscles of respiration  ABD:  Soft, NT, ND  MSK:  right UE and LE are smaller (less muscle mass) compared to left   SKIN:  No dermatitis, no apparent rash     NEUROLOGICAL EXAM:  Mental State:   Awake, Oriented to time, place, and persons. Attentive. Reactive affect.   LANGUAGE/SPEECH:    No dysphasia or paraphasic errors.    CN:   I:  Deferred  II:  VFF  III/IV/VI: EOMI, no nystagmus  V:  V1-V3 intact  VII:  left  facial droop, mainly involving the lower half of the face but there is also subtle involvement of the upper half, recurrent synkinesis on the left    VIII:  Hearing was intact to conversational voice  IX/X:  Palatal raise was intact bilaterally  XI:  Shoulder shrug was strong bilaterally; Pt is able to rotate head left and right against resistance  XII:  Tongue midline; able to move it left and right without any difficulties  MOTOR:  State and Bulk:  right UE and LE have smaller muscle bulk   Dexterity and speed: decreased finger tapping on the right (not the left)   Power: no focal weakness  Sensory:   No hemihypesthesia  Coordination:  Intact finger to nose and heel to shin bilaterally; no dysmetria or decomposition of movement  Involuntary Movement:    None observed  Gait:    Walks without assistive devices, slightly antalgic       Neuroimaging: as per HPI. I personally reviewed those images with the patient:   CTA done in July 2024 and showed occlusion of the right vert from its origin.     Labs:    No lab results found.     Recent Labs   Lab Test 07/01/24  1412   CHOL 118   HDL 57   LDL 41   TRIG 101       Recent Labs   Lab Test 07/01/24  1412   A1C 5.7*         Billing disclosure:    30 min spent on the date of the encounter in chart review, patient visit, review of tests, documentation and/or discussion with other providers about the issues documented above.         Again, thank you for allowing me to participate in the care of your patient.        Sincerely,        Nat Garcia MD    Electronically signed

## 2025-02-17 NOTE — PROGRESS NOTES
_____________________________________________________________    [facility]    at [facility] [phone]  _____________________________________________________________      Chief Complaint: follow up after Stroke      History of Present Illness: Ashkan Rodriguez is a 69 year old left handed man with history of CP (weaker smaller right UE and LE and left face) who had a stroke in Feb 2024. His stroke caused left sided weakness. MRI showed 2 infarcts, one in the right thalamus and one in the right temporal lobe. 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 Stroke etiology was unclear. He was put on   DAPT and loop recorder was implanted in April 2024 after non invasive monitoring was negative. Of note, the LDL was 31 and A1c was 5.7% and the MRA did not show atherosclerotic stenosis. He had been on ASA 81 and I kept him on that and ordered CTA head and neck to assess the non-stenotic atherosclerotic burden.     CTA head and neck was done in July 2024 and I personally reviewed. The right vert was occluded at its origin. There was minimal atherosclerotic burden of the aortic arch and large vessels of the head and neck.     Patient is here today for follow up. He is unaccompanied.   Denies any new stroke or TIA symptoms.   Reports residual left arm tightness, left hand weakness. He is left handed fine movements affected.   No functional impairment.   Checks BP at home. Usually 120-130/80s.   Tries to eat healthy.   Does not smoke.     mRS 1.         Diagnosis:  Problem List Items Addressed This Visit    None      Impression & Plan:     As discussed with the patient    Diagnosis:   History of stroke a year ago involving the right thalamus and the right temporal lobe.   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 I ordered a CTA which showed the same finding. I am not sure if the vertebral  "artery occlusion led to the stroke or it had been occluded before that (for example related to birth trauma).   You have a heart monitor device implanted and it has not shown any atrial fibrillation yet.     Plan:   Continue on the aspirin and other medications. No changes today. This regimen is the appropriate one for stroke prevention if the underlying cause is plaque build up in the arteries.   If your heart monitor shows any atrial fibrillation, we will switch from aspirin to one of the stronger blood thinners (pradaxa, xarelto, eliquis) which have better protection from stroke in patients with atrial fibrillation.     Follow up with me in 1 year. Call earlier if you need anything.     Stroke Education provided.  He will call us with any questions.  For any acute neurologic deficits he was advised to  go directly to the hospital rather than call the clinic.    Nat Garcia MD, Msc, MYRNA EASON   of Neurology  [facility]     02/17/2025 1:10 PM  To page me or covering stroke neurology team member, click here: AMCOM  Choose \"On Call\" tab at top, then search dropdown box for \"Neurology Adult\" & press Enter, look for Neuro ICU/Stroke    ___________________________________________________________________    Current Medications  Current Outpatient Medications   Medication Sig Dispense Refill    amLODIPine (NORVASC) 2.5 MG tablet Take 1 tablet (2.5 mg) by mouth every evening. 90 tablet 3    aspirin 81 MG EC tablet Take 81 mg by mouth daily      atorvastatin (LIPITOR) 40 MG tablet Take 1 tablet (40 mg) by mouth every evening. 90 tablet 3    multivitamin w/minerals (THERA-VIT-M) tablet Take 1 tablet by mouth daily      tamsulosin (FLOMAX) 0.4 MG capsule Take 1 capsule (0.4 mg) by mouth every evening. 90 capsule 3    predniSONE (DELTASONE) 20 MG tablet Take 1 tablet (20 mg) by mouth daily. (Patient not taking: Reported on 2/17/2025) 4 tablet 0     No current facility-administered " medications for this visit.       Past Medical History  Past Medical History:   Diagnosis Date    Cancer (H) 10/01/2001    Cerebral infarction (H) 2/10/2024    Heart disease 2018    Hypertension        Social History  Social History     Tobacco Use    Smoking status: Never     Passive exposure: Yes    Smokeless tobacco: Never    Tobacco comments:     1/2 pack /year   Vaping Use    Vaping status: Never Used   Substance Use Topics    Alcohol use: Not Currently     Comment: 1-2 beers/day    Drug use: Never       Family History  Family History   Problem Relation Age of Onset    Hypertension Mother     Hypertension Father     Ulcers Father     Hypertension Sister     Hyperlipidemia Sister     Hypertension Sister     Hypertension Brother     Lung Cancer Brother     Lung Cancer Maternal Grandmother     Cerebrovascular Disease Maternal Grandfather     Coronary Artery Disease Paternal Grandmother        Physical Exam    BP (!) 149/71   Pulse 75   SpO2 97%     General Exam:  GEN:  Awake, NAD  HEAD:  Atraumatic/Normocephalic  EYES:  Non-icterus, no conjunctivitis  EARS:  No otic discharge  NOSE:  Patent nares, no discharge  THROAT:  MMM, No oral lesions  CV:  RRR  PULM:  Breathing comfortably on room air, no tachypnea, not using accessory muscles of respiration  ABD:  Soft, NT, ND  MSK:  right UE and LE are smaller (less muscle mass) compared to left   SKIN:  No dermatitis, no apparent rash     NEUROLOGICAL EXAM:  Mental State:   Awake, Oriented to time, place, and persons. Attentive. Reactive affect.   LANGUAGE/SPEECH:    No dysphasia or paraphasic errors.    CN:   I:  Deferred  II:  VFF  III/IV/VI: EOMI, no nystagmus  V:  V1-V3 intact  VII:  left facial droop, mainly involving the lower half of the face but there is also subtle involvement of the upper half, recurrent synkinesis on the left    VIII:  Hearing was intact to conversational voice  IX/X:  Palatal raise was intact bilaterally  XI:  Shoulder shrug was strong  bilaterally; Pt is able to rotate head left and right against resistance  XII:  Tongue midline; able to move it left and right without any difficulties  MOTOR:  State and Bulk:  right UE and LE have smaller muscle bulk   Dexterity and speed: decreased finger tapping on the right (not the left)   Power: no focal weakness  Sensory:   No hemihypesthesia  Coordination:  Intact finger to nose and heel to shin bilaterally; no dysmetria or decomposition of movement  Involuntary Movement:    None observed  Gait:    Walks without assistive devices, slightly antalgic       Neuroimaging: as per HPI. I personally reviewed those images with the patient:   CTA done in July 2024 and showed occlusion of the right vert from its origin.     Labs:    No lab results found.     Recent Labs   Lab Test 07/01/24  1412   CHOL 118   HDL 57   LDL 41   TRIG 101       Recent Labs   Lab Test 07/01/24  1412   A1C 5.7*         Billing disclosure:    30 min spent on the date of the encounter in chart review, patient visit, review of tests, documentation and/or discussion with other providers about the issues documented above.

## 2025-02-17 NOTE — PATIENT INSTRUCTIONS
Diagnosis:   History of stroke a year ago involving the right thalamus and the right temporal lobe.   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 I ordered a CTA which showed the same finding. I am not sure if the vertebral artery occlusion led to the stroke or it had been occluded before that (for example related to birth trauma).   You have a heart monitor device implanted and it has not shown any atrial fibrillation yet.     Plan:   Continue on the aspirin and other medications. No changes today. This regimen is the appropriate one for stroke prevention if the underlying cause is plaque build up in the arteries.   If your heart monitor shows any atrial fibrillation, we will switch from aspirin to one of the stronger blood thinners (pradaxa, xarelto, eliquis) which have better protection from stroke in patients with atrial fibrillation.     Follow up with me in 1 year. Call earlier if you need anything.     Nat Garcia MD, Msc, FAMAXWELL, FAAN   of Neurology  [facility]

## 2025-03-12 PROBLEM — S99.912A LEFT ANKLE INJURY: Status: RESOLVED | Noted: 2024-05-31 | Resolved: 2025-03-12

## 2025-04-23 ENCOUNTER — OFFICE VISIT (OUTPATIENT)
Dept: FAMILY MEDICINE | Facility: CLINIC | Age: 70
End: 2025-04-23
Payer: COMMERCIAL

## 2025-04-23 VITALS
SYSTOLIC BLOOD PRESSURE: 112 MMHG | RESPIRATION RATE: 20 BRPM | WEIGHT: 197 LBS | BODY MASS INDEX: 29.18 KG/M2 | HEIGHT: 69 IN | HEART RATE: 74 BPM | DIASTOLIC BLOOD PRESSURE: 64 MMHG | OXYGEN SATURATION: 96 % | TEMPERATURE: 98.3 F

## 2025-04-23 DIAGNOSIS — G80.9 CEREBRAL PALSY, UNSPECIFIED TYPE (H): ICD-10-CM

## 2025-04-23 DIAGNOSIS — H66.011 NON-RECURRENT ACUTE SUPPURATIVE OTITIS MEDIA OF RIGHT EAR WITH SPONTANEOUS RUPTURE OF TYMPANIC MEMBRANE: Primary | ICD-10-CM

## 2025-04-23 PROBLEM — I65.23 ATHEROSCLEROSIS OF BOTH CAROTID ARTERIES: Status: ACTIVE | Noted: 2019-08-27

## 2025-04-23 PROBLEM — S82.55XD CLOSED NONDISPLACED FRACTURE OF MEDIAL MALLEOLUS OF LEFT TIBIA WITH ROUTINE HEALING: Status: ACTIVE | Noted: 2024-02-27

## 2025-04-23 PROBLEM — S82.892A CLOSED FRACTURE OF LEFT ANKLE: Status: ACTIVE | Noted: 2024-02-12

## 2025-04-23 PROBLEM — I63.9 CVA (CEREBRAL VASCULAR ACCIDENT) (H): Status: ACTIVE | Noted: 2024-02-10

## 2025-04-23 RX ORDER — OFLOXACIN 3 MG/ML
10 SOLUTION AURICULAR (OTIC) DAILY
Qty: 10 ML | Refills: 0 | Status: SHIPPED | OUTPATIENT
Start: 2025-04-23 | End: 2025-04-23

## 2025-04-23 RX ORDER — OFLOXACIN 3 MG/ML
10 SOLUTION AURICULAR (OTIC) DAILY
Qty: 10 ML | Refills: 0 | Status: SHIPPED | OUTPATIENT
Start: 2025-04-23

## 2025-04-23 NOTE — PROGRESS NOTES
"  Assessment & Plan     Non-recurrent acute suppurative otitis media of right ear with spontaneous rupture of tympanic membrane  Uncontrolled.  - amoxicillin-clavulanate (AUGMENTIN) 875-125 MG tablet; Take 1 tablet by mouth 2 times daily.  - ofloxacin (FLOXIN) 0.3 % otic solution; Place 10 drops into the right ear daily.  The risks, benefits and treatment options of prescribed medications or other treatments have been discussed with the patient. The patient verbalized their understanding and should call or follow up if no improvement or if they develop further problems    Cerebral palsy, unspecified type (H)  stable                  Subjective   Ashkan is a 69 year old, presenting for the following health issues:  Ear Problem    History of Present Illness       Reason for visit:  Right ear infection  Symptom onset:  3-7 days ago  Symptoms include:  Slight headache, discharge from ear and pain in right ear  Symptom intensity:  Moderate  Symptom progression:  Staying the same  Had these symptoms before:  No  What makes it worse:  No  What makes it better:  No   He is taking medications regularly.        Patient cerebral palsy      Review of Systems  Constitutional, HEENT, cardiovascular, pulmonary, gi and gu systems are negative, except as otherwise noted.      Objective    /64   Pulse 74   Temp 98.3  F (36.8  C) (Oral)   Resp 20   Ht 1.745 m (5' 8.7\")   Wt 89.4 kg (197 lb)   SpO2 96%   BMI 29.35 kg/m    Body mass index is 29.35 kg/m .  Physical Exam   GENERAL: alert and no distress  HENT: normal cephalic/atraumatic, right ear: purulent drainage in canal, left ear: normal: no effusions, no erythema, normal landmarks, nose and mouth without ulcers or lesions, oropharynx clear, and oral mucous membranes moist        Signed Electronically by: Ruth Reno MD    "

## 2025-04-23 NOTE — PATIENT INSTRUCTIONS
Darryl Luther,    Thank you for allowing [facility] to manage your care.      I sent your prescriptions to your pharmacy.    For your convenience, test results are released as soon as they are available  Please allow 1-2 business days for me to send you a comment about your results.  If not done so, I encourage you to login into [facility] [email or web address] to review your results in real time.     If you have any questions or concerns, please feel free to call us at [phone].    Sincerely,    Dr. Reno    Did you know?      You can schedule a video visit for follow-up appointments as well as future appointments for certain conditions.  Please see the below link.     [email or web address]    If you have not already done so,  I encourage you to sign up for [facility] ([email or web address]).  This will allow you to review your results, securely communicate with a provider, and schedule virtual visits as well.

## 2025-04-28 ENCOUNTER — OFFICE VISIT (OUTPATIENT)
Dept: FAMILY MEDICINE | Facility: CLINIC | Age: 70
End: 2025-04-28
Payer: COMMERCIAL

## 2025-04-28 VITALS
HEART RATE: 74 BPM | RESPIRATION RATE: 18 BRPM | BODY MASS INDEX: 29.67 KG/M2 | HEIGHT: 68 IN | TEMPERATURE: 97.6 F | WEIGHT: 195.8 LBS | OXYGEN SATURATION: 97 % | SYSTOLIC BLOOD PRESSURE: 118 MMHG | DIASTOLIC BLOOD PRESSURE: 70 MMHG

## 2025-04-28 DIAGNOSIS — H60.391 INFECTIVE OTITIS EXTERNA, RIGHT: Primary | ICD-10-CM

## 2025-04-28 PROCEDURE — 1125F AMNT PAIN NOTED PAIN PRSNT: CPT | Performed by: PHYSICIAN ASSISTANT

## 2025-04-28 PROCEDURE — 3074F SYST BP LT 130 MM HG: CPT | Performed by: PHYSICIAN ASSISTANT

## 2025-04-28 PROCEDURE — 3078F DIAST BP <80 MM HG: CPT | Performed by: PHYSICIAN ASSISTANT

## 2025-04-28 PROCEDURE — 99214 OFFICE O/P EST MOD 30 MIN: CPT | Performed by: PHYSICIAN ASSISTANT

## 2025-04-28 RX ORDER — NEOMYCIN SULFATE, POLYMYXIN B SULFATE, HYDROCORTISONE 3.5; 10000; 1 MG/ML; [USP'U]/ML; MG/ML
3 SOLUTION/ DROPS AURICULAR (OTIC) 4 TIMES DAILY
Qty: 10 ML | Refills: 0 | Status: SHIPPED | OUTPATIENT
Start: 2025-04-28 | End: 2025-05-01

## 2025-04-28 RX ORDER — CEFDINIR 300 MG/1
600 CAPSULE ORAL DAILY
Qty: 14 CAPSULE | Refills: 0 | Status: SHIPPED | OUTPATIENT
Start: 2025-04-28 | End: 2025-05-01

## 2025-04-28 ASSESSMENT — PAIN SCALES - GENERAL: PAINLEVEL_OUTOF10: SEVERE PAIN (8)

## 2025-04-28 NOTE — PATIENT INSTRUCTIONS
Odalis Luther,    Thank you for allowing [facility] to manage your care.    You have recurrent external ear infection.    Based on our discussion, I have outlined the following instructions for you:      - Start using the new medications: Cortisporin antibiotic solution and cefdinir.  - Take the new medications for a full 7 days.  - If your ear does not get better by the end of the week, you will need to see us again or a specialist for ears, nose, and throat. I made a referral to ENT. Please call the specialty number on your after visit summary.   - Stop taking your old medications and begin the new ones right away.    If you develop worsening/changing symptoms at any time, please be seen in clinic/urgent care for evaluation as we discussed.    I sent your prescriptions to your pharmacy. Take a probiotic pill, eat live culture yogurt (Greek yogurt or Activia), drink kefir daily for one week after finishing the antibiotics to encourage growth of good bacteria in your system.    If you have any questions or concerns, please feel free to call us at [phone]    Sincerely,    Otoniel Duran PA-C    Did you know?      You can schedule a video visit for follow-up appointments as well as future appointments for certain conditions.  Please see the below link.     [email or web address]    If you have not already done so,  I encourage you to sign up for Mychart ([email or web address]).  This will allow you to review your results, securely communicate with a provider, and schedule virtual visits as well.

## 2025-04-28 NOTE — PROGRESS NOTES
"  Assessment & Plan   Problem List Items Addressed This Visit    None  Visit Diagnoses       Infective otitis externa, right    -  Primary    Relevant Medications    neomycin-polymyxin-hydrocortisone (CORTISPORIN) 3.5-37336-5 otic solution    cefdinir (OMNICEF) 300 MG capsule    Other Relevant Orders    Adult ENT  Referral           Right sided recurrent otitis externa. could have concurrent AOM, but TM not visible. Low suspicion for strep throat, mastoiditis, malignant otitis or other worrisome issue. Will discharge with over the counter analgesics and course of the above. ENT referral if not improving, though he can certainly follow up with us as well depending on access to ENT.     Complete history and physical exam as below. Afebrile with normal vital signs.     DDx and Dx discussed with and explained to the parent to their satisfaction.  All questions were answered at this time. Parent expressed understanding of and agreement with this dx, tx, and plan. No further workup warranted and standard medication warnings given. I have given the parent a list of pertinent indications for re-evaluation. Will return if symptoms worsen or new concerning symptoms arise. Patient left with parent in no apparent distress.       BMI  Estimated body mass index is 29.71 kg/m  as calculated from the following:    Height as of this encounter: 1.729 m (5' 8.07\").    Weight as of this encounter: 88.8 kg (195 lb 12.8 oz).     Follow-up  Return in about 4 days (around 5/2/2025) for a recheck of your symptoms if not improving, or call 911/go to an ER anytime if worsening.    Irma Luther is a 69 year old, presenting for the following health issues:  Ear Problem        4/28/2025     7:02 AM   Additional Questions   Roomed by Doc Echevarria CMA   Accompanied by N/A         4/28/2025     7:02 AM   Patient Reported Additional Medications   Patient reports taking the following new medications No new medications     History of " "Present Illness       Reason for visit:  Ear infection  Symptom onset:  3-7 days ago  Symptoms include:  Slight headache and pain in right ear  Symptom intensity:  Moderate  Symptom progression:  Staying the same  Had these symptoms before:  No  What makes it worse:  No  What makes it better:  No   He is taking medications regularly.   Ashkan Rodriguez, 69-year-old male  - right ear pain for approximately 9 days, started after swimming while out of the country  - Diminished hearing in the affected ear  - Sore throat, difficulty chewing due to pain  - No fever, chills, or rashes reported  - Previous visit 5 days ago with no improvement from initial treatment with Augmentin/ofloxacin gtts  - No allergies to antibiotics    Review of Systems  Constitutional, HEENT, cardiovascular, pulmonary, gi and gu systems are negative, except as otherwise noted.      Objective    /70   Pulse 74   Temp 97.6  F (36.4  C) (Temporal)   Resp 18   Ht 1.729 m (5' 8.07\")   Wt 88.8 kg (195 lb 12.8 oz)   SpO2 97%   BMI 29.71 kg/m    Body mass index is 29.71 kg/m .  Physical Exam  Vitals and nursing note reviewed.   Constitutional:       General: He is not in acute distress.     Appearance: Normal appearance. He is not diaphoretic.   HENT:      Head: Normocephalic and atraumatic.      Right Ear: External ear normal.      Left Ear: Tympanic membrane, ear canal and external ear normal.      Ears:      Comments: No mastoid or external ear tenderness. Right canal has white discharge with some tortuosity and edema. TM not visible.      Nose: Nose normal.      Mouth/Throat:      Mouth: Mucous membranes are moist.      Pharynx: Oropharynx is clear.   Eyes:      Conjunctiva/sclera: Conjunctivae normal.   Pulmonary:      Effort: Pulmonary effort is normal. No respiratory distress.   Musculoskeletal:      Cervical back: Neck supple. No rigidity.   Lymphadenopathy:      Cervical: No cervical adenopathy.   Skin:     General: Skin is dry.     "  Coloration: Skin is not jaundiced or pale.   Neurological:      General: No focal deficit present.      Mental Status: He is alert. Mental status is at baseline.   Psychiatric:         Mood and Affect: Mood normal.         Behavior: Behavior normal.                  Signed Electronically by: BRIAN Cid

## 2025-05-01 ENCOUNTER — TELEPHONE (OUTPATIENT)
Dept: FAMILY MEDICINE | Facility: CLINIC | Age: 70
End: 2025-05-01

## 2025-05-01 ENCOUNTER — OFFICE VISIT (OUTPATIENT)
Dept: URGENT CARE | Facility: URGENT CARE | Age: 70
End: 2025-05-01
Payer: COMMERCIAL

## 2025-05-01 VITALS
RESPIRATION RATE: 19 BRPM | TEMPERATURE: 97.8 F | SYSTOLIC BLOOD PRESSURE: 122 MMHG | WEIGHT: 197.6 LBS | OXYGEN SATURATION: 98 % | HEART RATE: 81 BPM | DIASTOLIC BLOOD PRESSURE: 75 MMHG | BODY MASS INDEX: 29.98 KG/M2

## 2025-05-01 DIAGNOSIS — I48.91 ATRIAL FIBRILLATION, UNSPECIFIED TYPE (H): Primary | ICD-10-CM

## 2025-05-01 DIAGNOSIS — H60.391 INFECTIVE OTITIS EXTERNA, RIGHT: Primary | ICD-10-CM

## 2025-05-01 RX ORDER — CIPROFLOXACIN 500 MG/1
500 TABLET, FILM COATED ORAL 2 TIMES DAILY
Qty: 14 TABLET | Refills: 0 | Status: SHIPPED | OUTPATIENT
Start: 2025-05-01 | End: 2025-05-08

## 2025-05-01 NOTE — TELEPHONE ENCOUNTER
TC: Please assist with faxing cardiology referral to [phone] ASAP.     Called and spoke to patient, writer relayed provider's message below as written. Advise patient to call [facility] at [phone] to  schedule appointment within next couple of days. Writer provided information below.       Advise patient if unable to be seen to seek care at ER, patient agreed and will plan on going to [facility] in [place] if needed.     Writer called [facility] and spoke with Shara and relayed provider's message below:     Shara asked if PCP is starting Eliquis medication at this time, advise no orders for Eliquis and relayed below. Shara verbalized understanding.       SCOTT Rodriguez  [facility]

## 2025-05-01 NOTE — PROGRESS NOTES
Assessment & Plan     Infective otitis externa, right    - SC REMOVAL IMPACTED CERUMEN IRRIGATION/LVG UNILAT  - Adult ENT  Referral  - ciprofloxacin (CIPRO) 500 MG tablet  Dispense: 14 tablet; Refill: 0     PROCEDURE:  Purulent discharge removal done in right ear via lavage done by MA and supervised by myself    Patient reports immediate improvement in right ear after lavage, pain went from severe to moderate and hearing has improved. Still unable to visualize right TM. Right ear canal moderately edematous, erythematous with two 2 mm pustules.       Stop Cefdinir  Stop neomycin-polymyxin-hydrocortisone drops      Ciprofloxain twice daily for 7 days- discussed risk for tendon rupture  Ofloxacin 10 drops to right ear daily for 7 days    Make ENT appt- urgent referral placed    Continue Tylenol    Hold vitamin until finished with antibiotic    Go to emergency room if symptoms worsen    Follow-up with ENT if symptoms persist for 5 days, and sooner if symptoms worsen or new symptoms develop.     Discussed red flag symptoms which warrant immediate visit in emergency room    All questions were answered and patient verbalized understanding. AVS reviewed with patient.       31 minutes spent during visit, chart review, and charting on day of encounter.    Vesna Kay, DNP, APRN, CNP 5/1/2025 11:08 AM  [facility]     Ashkan is a 69 year old male who presents to clinic today for the following health issues:  Chief Complaint   Patient presents with    Otalgia     Right ear pain and drainage for 2 weeks. Pt was prescribed amoxicillin then switched to cefdinir and sx persisting.          5/1/2025    10:16 AM   Additional Questions   Roomed by Lani   Accompanied by Self     Patient presents for evaluation of right ear pain. Associated symptoms: right ear drainage, decreased hearing. Pain is severe 7/10, was 8/10 before abx. Symptoms have been present for 2 weeks and have been  stable. Has been taking tylenol which helps temporarily, last had last night. Denies fever.     He was evaluated for this 4/23/25 and treated with Augmentin and ofloxacin for right otitis media. He was re-evaluated for this 4/28/25  and changed to cortisporin and Cefdinir and referred to ENT and TM was not visible. ENT appt schedule for August.     He has a history of cerebral palsy, stroke.     Problem list, Medication list, Allergies, and Medical history reviewed in EPIC.    ROS:  Review of systems negative except for noted above        Objective    /75 (BP Location: Left arm, Cuff Size: Adult Regular)   Pulse 81   Temp 97.8  F (36.6  C) (Tympanic)   Resp 19   Wt 89.6 kg (197 lb 9.6 oz)   SpO2 98%   BMI 29.98 kg/m    Physical Exam  Constitutional:       General: He is not in acute distress.     Appearance: He is not toxic-appearing or diaphoretic.   HENT:      Head: Normocephalic and atraumatic.      Right Ear: Decreased hearing noted. Tenderness present. No mastoid tenderness.      Left Ear: Tympanic membrane, ear canal and external ear normal.      Ears:      Comments: Purulent discharge occluding right ear canal, unable to visualize TM. Tenderness with palpation right tragus     Nose: Nose normal.      Mouth/Throat:      Mouth: Mucous membranes are moist.      Pharynx: Oropharynx is clear. No oropharyngeal exudate or posterior oropharyngeal erythema.   Lymphadenopathy:      Cervical: No cervical adenopathy.   Neurological:      Mental Status: He is alert.

## 2025-05-01 NOTE — TELEPHONE ENCOUNTER
Nurse from Neurology called.    She stated sorry I think I have the wrong clinic.  I asked if I could help her. And she stated no.      Analy SCHMID RN  Triage Nurse  [facility]

## 2025-05-01 NOTE — PROGRESS NOTES
Urgent Care Clinic Visit  Chief Complaint   Patient presents with    Otalgia     Right ear pain and drainage for 2 weeks. Pt was prescribed amoxicillin then switched to cefdinir and sx persisting.                5/1/2025    10:16 AM   Additional Questions   Roomed by Lani   Accompanied by Self

## 2025-05-01 NOTE — TELEPHONE ENCOUNTER
Referral for cardiology was faxed to [phone] as requested.    [address] [address]  [phone]  Fax [phone]

## 2025-05-01 NOTE — PATIENT INSTRUCTIONS
Stop Cefdinir  Stop neomycin-polymyxin-hydrocortisone drops      Ciprofloxain twice daily for 7 days  Ofloxacin 10 drops to right ear daily for 7 days    Make ENT appt    Continue Tylenol    Hold vitamin until finished with antibiotic    Go to emergency room if symptoms worsen

## 2025-05-01 NOTE — TELEPHONE ENCOUNTER
I placed a referral for [facility], where he is currently getting the device check  Please inform patient to be seen by cardiology in the next couple of days  If he is not able to see, he should be seen in the ER given his previous cardiac history

## 2025-05-01 NOTE — TELEPHONE ENCOUNTER
Called [facility] and spoke with Maite due to Domi is unavailable at this time. Relayed provider's message below as written.     Maite stated patient does not have an established cardiologist with [facility] at this time. Maite stated patient was last seen April of 2024 with [facility] one time only and OV notes stated patient follow up as needed but [facility] is not following patient only following device checks.     Routing above information to PCP to review and place referral for cardiology.    SCOTT Rodriguez  [facility]           UNABLE

## 2025-05-01 NOTE — TELEPHONE ENCOUNTER
Incoming call from [facility]. They received a notification from patient LYNQ device that he was in AFIB for 16 minutes yesterday morning at 0600. Reports that his average V rate was 154 BPM.     They are recommending he start on Eliquis 5 mg twice per day  They request this be completed by patient's PCP  If PCP feels like this needs to be managed by cardiology, patient will need a referral.     [facility] has reached out to the patient to discuss if he was symptomatic but they were unable to reach him.     Routing to provider, please advise on how you would like to proceed with these recommendations. Camden Escobedo, RN, BSN, PHN

## 2025-06-06 NOTE — PROGRESS NOTES
AUDIOLOGY REPORT    SUBJECTIVE:  Patient was referred to [facility] by BRIAN Cid for a hearing examination.      The patient reports they were traveling out of country in April and had a right ear infection. The patient reports they had to use three different antibiotics to treat the ear infection as their ear canal had swollen almost completely shut. The patient reports they had been doing a lot of swimming at that time. The patient reports their ear was draining in early May but has not for the last month or so. The patient reports some soreness still around the right ear. The patient reports a history of noise exposure with being in the [employer] and working in [employer] rooms. The patient reports a family history of hearing loss for his parents and one of his grandparents.The patient denies dizziness, tinnitus, and ear surgeries. The patient was unaccompanied at today's appointment.    OBJECTIVE:  Otoscopy:  Otoscopic exam indicates slight cerumen in the right ear and clear canal for the left ear     Tympanograms:    RIGHT: normal eardrum mobility    LEFT:   normal eardrum mobility    Reflexes: (reported by stimulus ear): 1000 Hz  RIGHT: Ipsilateral is present at normal levels  RIGHT: Contralateral is absent at frequencies tested  LEFT:   Ipsilateral is present at normal levels  LEFT:   Contralateral is present at normal levels    Thresholds:   Pure Tone Thresholds assessed using conventional audiometry with good  reliability from 250-8000 Hz bilaterally using insert earphones and circumaural headphones     RIGHT:  normal sloping to moderate-severe sensorineural hearing loss    LEFT:    normal hearing sensitivity    Speech Reception Threshold:    RIGHT: 5 dB HL    LEFT:   5 dB HL    Word Recognition Score:     RIGHT: 100% at 50 dB HL using NU-6 recorded word list.    LEFT:   100% at 50 dB HL using NU-6 recorded word list.    ASSESSMENT: Today's testing revealed a sensorineural hearing loss  for the right ear and normal hearing sensitivity for the left ear.  Today s results were discussed with the patient in detail.     PLAN: Follow up with ENT.      Marco A Woodson, CCC-A  Doctor of Audiology, [place] #[id number]   July 1, 2025

## 2025-07-01 ENCOUNTER — OFFICE VISIT (OUTPATIENT)
Dept: OTOLARYNGOLOGY | Facility: CLINIC | Age: 70
End: 2025-07-01
Payer: COMMERCIAL

## 2025-07-01 ENCOUNTER — OFFICE VISIT (OUTPATIENT)
Dept: AUDIOLOGY | Facility: CLINIC | Age: 70
End: 2025-07-01
Payer: COMMERCIAL

## 2025-07-01 VITALS
SYSTOLIC BLOOD PRESSURE: 118 MMHG | TEMPERATURE: 96.7 F | HEIGHT: 68 IN | DIASTOLIC BLOOD PRESSURE: 68 MMHG | WEIGHT: 200.1 LBS | BODY MASS INDEX: 30.33 KG/M2

## 2025-07-01 DIAGNOSIS — H90.41 SENSORINEURAL HEARING LOSS (SNHL) OF RIGHT EAR WITH UNRESTRICTED HEARING OF LEFT EAR: Primary | ICD-10-CM

## 2025-07-01 PROCEDURE — 3074F SYST BP LT 130 MM HG: CPT

## 2025-07-01 PROCEDURE — 92557 COMPREHENSIVE HEARING TEST: CPT

## 2025-07-01 PROCEDURE — 92550 TYMPANOMETRY & REFLEX THRESH: CPT

## 2025-07-01 PROCEDURE — 99203 OFFICE O/P NEW LOW 30 MIN: CPT

## 2025-07-01 PROCEDURE — 1126F AMNT PAIN NOTED NONE PRSNT: CPT

## 2025-07-01 PROCEDURE — 3078F DIAST BP <80 MM HG: CPT

## 2025-07-01 RX ORDER — APIXABAN 5 MG/1
5 TABLET, FILM COATED ORAL 2 TIMES DAILY
COMMUNITY

## 2025-07-01 ASSESSMENT — PAIN SCALES - GENERAL: PAINLEVEL_OUTOF10: NO PAIN (0)

## 2025-07-01 NOTE — PATIENT INSTRUCTIONS
You were seen by Dimple Edwards CNP.  If you have questions or concerns regarding your appointment today, you can reach out to our call center at [phone].  The following has been recommended at your appointment today:    Let's repeat your hearing test in 1 year and follow up with me.   It is possible that you have some hearing damage from your recent infection.   Let's continue to monitor symptoms. If they worsen, please let me know.       HEARING LOSS EDUCATION: You have sensorineural hearing loss.   Conductive hearing loss occurs when sound conduction is impeded through the external ear, the middle ear, or both. Sensorineural hearing loss (SNHL) occurs when there is a problem within the cochlea or the neural pathway to the auditory cortex. Mixed hearing loss is concomitant conductive and sensorineural loss.    CONDUCTIVE:   Conductive ofe loss involves the external or middle ear.   Hearing loss occurs when sound can't pass through your outer or middle ear.   Something is blocking the sound from getting through the path way of sound.     SENSORINEURAL HEARING LOSS:   Involves the connection between your ear and nervous system.  Leads to problems converting sound vibrations to neural signals (electrical signals) that the brain can't interpret.   Hearing damage.   Hair cells in the cochlear are damaged and laying flat.     MIXED HEARING LOSS:   Mixture of conductive and SNHL.

## 2025-07-01 NOTE — PROGRESS NOTES
Chief Complaint   Patient presents with    Consult     History of Present Illness  Ashkan Rodriguez is a 69 year old male who presents to today for ear evaluation. Referred by Uma Duran PA-C for concerns of recurrent otitis externa.      Ashkan, was seen in clinic on 05/01/25 for concerns of cerumen removal. After removal, it was noted that the right ear canal was moderately edematous, erythematous with two 2 mm pustules. Treated with Cirprodex for 7 days and Ofloxacin for 7 days.     He tells me that for the most part symptoms have improved. Prior to infection he was out of the county and was swimming a lot. He had difficulty time chewing. These symptoms have since then improved.     \He has not had any difficulty hearing others in loud or crowded areas. No history of chronic ear disease or surgeries. 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 He has worn hearing protection since he was 29 years old. No family hearing loss at a young age, parents have severe hearing loss when older.       Past Medical History  Patient Active Problem List   Diagnosis    Cerebral infarction (H)    Coronary artery calcification seen on CAT scan    History of thymus cancer    Calculus of gallbladder without cholecystitis without obstruction    Elevated fasting glucose    Benign prostatic hyperplasia without lower urinary tract symptoms    Spastic hemiparesis of left dominant side (H)    Benign essential hypertension    Hyperlipidemia LDL goal <100    S/P ORIF (open reduction internal fixation) fracture, left ankle    Elevated liver enzymes    Cerebral palsy, unspecified type (H)    Ascending aorta dilation (H24), 4.1cm    Acute gouty arthritis    Pulmonary infiltrate on chest x-ray    CVA (cerebral vascular accident) (H)    History of elevated PSA    Closed nondisplaced fracture of medial malleolus of left tibia with routine healing    Closed fracture of  left ankle    Atherosclerosis of both carotid arteries    Cholelithiasis    Cerebral palsy (H)     Current Medications     Current Outpatient Medications:     amLODIPine (NORVASC) 2.5 MG tablet, Take 1 tablet (2.5 mg) by mouth every evening., Disp: 90 tablet, Rfl: 3    aspirin 81 MG EC tablet, Take 81 mg by mouth daily, Disp: , Rfl:     atorvastatin (LIPITOR) 40 MG tablet, Take 1 tablet (40 mg) by mouth every evening., Disp: 90 tablet, Rfl: 3    multivitamin w/minerals (THERA-VIT-M) tablet, Take 1 tablet by mouth daily, Disp: , Rfl:     tamsulosin (FLOMAX) 0.4 MG capsule, Take 1 capsule (0.4 mg) by mouth every evening., Disp: 90 capsule, Rfl: 3    Allergies  No Known Allergies    Social History   Social History     Socioeconomic History    Marital status: Single   Tobacco Use    Smoking status: Never     Passive exposure: Yes    Smokeless tobacco: Never    Tobacco comments:     1/2 pack /year   Vaping Use    Vaping status: Never Used   Substance and Sexual Activity    Alcohol use: Not Currently     Comment: 1-2 beers/day    Drug use: Never    Sexual activity: Not Currently     Partners: Female     Birth control/protection: None   Other Topics Concern    Parent/sibling w/ CABG, MI or angioplasty before 65F 55M? No     Social Drivers of Health     Financial Resource Strain: Low Risk  (9/19/2024)    Financial Resource Strain     Within the past 12 months, have you or your family members you live with been unable to get utilities (heat, electricity) when it was really needed?: No   Food Insecurity: Low Risk  (9/19/2024)    Food Insecurity     Within the past 12 months, did you worry that your food would run out before you got money to buy more?: No     Within the past 12 months, did the food you bought just not last and you didn t have money to get more?: No   Transportation Needs: Low Risk  (9/19/2024)    Transportation Needs     Within the past 12 months, has lack of transportation kept you from medical appointments,  "getting your medicines, non-medical meetings or appointments, work, or from getting things that you need?: No   Physical Activity: Insufficiently Active (9/19/2024)    Exercise Vital Sign     Days of Exercise per Week: 3 days     Minutes of Exercise per Session: 30 min   Stress: No Stress Concern Present (9/19/2024)    [employer] - Occupational Stress Questionnaire     Feeling of Stress : Only a little   Social Connections: Unknown (9/19/2024)    Social Connection and Isolation Panel [NHANES]     Frequency of Social Gatherings with Friends and Family: Twice a week   Interpersonal Safety: Low Risk  (4/23/2025)    Interpersonal Safety     Do you feel physically and emotionally safe where you currently live?: Yes     Within the past 12 months, have you been hit, slapped, kicked or otherwise physically hurt by someone?: No     Within the past 12 months, have you been humiliated or emotionally abused in other ways by your partner or ex-partner?: No   Housing Stability: Low Risk  (9/19/2024)    Housing Stability     Do you have housing? : Yes     Are you worried about losing your housing?: No       Family History  Family History   Problem Relation Age of Onset    Hypertension Mother     Hypertension Father     Ulcers Father     Hypertension Sister     Hyperlipidemia Sister     Hypertension Sister     Hypertension Brother     Lung Cancer Brother     Lung Cancer Maternal Grandmother     Cerebrovascular Disease Maternal Grandfather     Coronary Artery Disease Paternal Grandmother        Review of Systems  As per HPI and PMHx, otherwise 10+ comprehensive system review is negative.    Physical Exam  /68   Temp (!) 96.7  F (35.9  C) (Temporal)   Ht 1.727 m (5' 8\")   Wt 90.8 kg (200 lb 1.6 oz)   BMI 30.43 kg/m    GENERAL: Patient is a pleasant, cooperative 69 year old male in no acute distress.  HEAD: Normocephalic, atraumatic.  Hair and scalp are normal.  EYES: Pupils are equal, round, " reactive to light and accommodation.  Extraocular movements are intact.  The sclera nonicteric without injection.  The extraocular structures are normal.  EARS: Normal shape and symmetry.  No mastoid tenderness, fluctuance, or erythema.    NOSE: Nares are patent.  Nasal mucosa is pink.  ORAL CAVITY: Dentition is in good repair.  Mucous membranes are moist.  Tongue is mobile, protrudes to the midline.  Palate elevates symmetrically.  Tonsils are +0.  No erythema or exudate.  No oral cavity or oropharyngeal masses, lesions, ulcerations, leukoplakia.  NECK: Supple, trachea is midline.  There no palpable cervical lymphadenopathy or masses bilaterally.  Palpation of the bilateral parotid and submandibular areas reveal no masses.  No thyromegaly.    NEUROLOGIC: Cranial nerves II through XII are grossly intact.  Voice is strong.  Patient is House-Brackmann I/VI bilaterally.  CARDIOVASCULAR: Extremities are warm and well-perfused.  No significant peripheral edema.  RESPIRATORY: Patient has nonlabored breathing without cough, wheeze, stridor.  PSYCHIATRIC: Patient is alert and oriented.  Mood and affect appear normal.  SKIN: Warm and dry.  No scalp, face, or neck lesions noted.    Audiogram  The patient underwent an audiogram performed today.  My review of the audiogram shows normal from 250-4000 HZ sloping to moderate severe likely SNHL right and normal hearing left.  Pure-tone average is 8 dB on the right and 7 dB on the left.  Speech reception threshold is 5 dB on the right and 5 dB on the left.  The patient had 100% word recognition on the right and 100% word recognition on the left.  The patient had a A tympanogram on the right and a A tympanogram on the left.          Assessment and Plan     ICD-10-CM    1. Sensorineural hearing loss (SNHL) of right ear with unrestricted hearing of left ear  H90.41         It was my pleasure seeing Ashkan Rodriguez today in clinic.  Overall, the patient's symptoms have improved. Based  on his audiology examination, in the right ear there is a decrease in hearing starting at 2000 HZ down to 8000 HZ indicating a potentially sensorineural hearing loss. Wd discussed this could be related to his recent infection, genetics, or noise exposure throughout the years.     We discussed getting an MRI to rule out a acoustic neuroma on the hearing nerve. However, based on the word recognition score of 100% bilaterally, I feel this is less likely at this point.     We will follow up in 1 year to repeat hearing test and ENT follow up. Sooner if symptoms worsen.     AUSTIN Lambert [facility]  Otolaryngology  [facility]

## 2025-07-01 NOTE — LETTER
7/1/2025      Ashkan Rodriguez  [address]  [facility] [address]      Dear Colleague,    Thank you for referring your patient, Ashkan Rodriguez, to the [facility]. Please see a copy of my visit note below.    Chief Complaint   Patient presents with     Consult     History of Present Illness  Ashkan Rodriguez is a 69 year old male who presents to today for ear evaluation. Referred by Uma Duran PA-C for concerns of recurrent otitis externa.      Ashkan was seen in clinic on 05/01/25 for concerns of cerumen removal. After removal, it was noted that the right ear canal was moderately edematous, erythematous with two 2 mm pustules. Treated with Cirprodex for 7 days and Ofloxacin for 7 days.     He tells me that for the most part symptoms have improved. Prior to infection he was out of the [facility] and was swimming a lot. He had difficulty time chewing. These symptoms have since then improved.     \He has not had any difficulty hearing others in loud or crowded areas. No history of chronic ear disease or surgeries. 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 He has worn hearing protection since he was 29 years old. No family hearing loss at a young age, parents have severe hearing loss when older.       Past Medical History  Patient Active Problem List   Diagnosis     Cerebral infarction (H)     Coronary artery calcification seen on CAT scan     History of thymus cancer     Calculus of gallbladder without cholecystitis without obstruction     Elevated fasting glucose     Benign prostatic hyperplasia without lower urinary tract symptoms     Spastic hemiparesis of left dominant side (H)     Benign essential hypertension     Hyperlipidemia LDL goal <100     S/P ORIF (open reduction internal fixation) fracture, left ankle     Elevated liver enzymes     Cerebral palsy, unspecified type (H)     Ascending aorta dilation  (H24), 4.1cm     Acute gouty arthritis     Pulmonary infiltrate on chest x-ray     CVA (cerebral vascular accident) (H)     History of elevated PSA     Closed nondisplaced fracture of medial malleolus of left tibia with routine healing     Closed fracture of left ankle     Atherosclerosis of both carotid arteries     Cholelithiasis     Cerebral palsy (H)     Current Medications     Current Outpatient Medications:      amLODIPine (NORVASC) 2.5 MG tablet, Take 1 tablet (2.5 mg) by mouth every evening., Disp: 90 tablet, Rfl: 3     aspirin 81 MG EC tablet, Take 81 mg by mouth daily, Disp: , Rfl:      atorvastatin (LIPITOR) 40 MG tablet, Take 1 tablet (40 mg) by mouth every evening., Disp: 90 tablet, Rfl: 3     multivitamin w/minerals (THERA-VIT-M) tablet, Take 1 tablet by mouth daily, Disp: , Rfl:      tamsulosin (FLOMAX) 0.4 MG capsule, Take 1 capsule (0.4 mg) by mouth every evening., Disp: 90 capsule, Rfl: 3    Allergies  No Known Allergies    Social History   Social History     Socioeconomic History     Marital status: Single   Tobacco Use     Smoking status: Never     Passive exposure: Yes     Smokeless tobacco: Never     Tobacco comments:     1/2 pack /year   Vaping Use     Vaping status: Never Used   Substance and Sexual Activity     Alcohol use: Not Currently     Comment: 1-2 beers/day     Drug use: Never     Sexual activity: Not Currently     Partners: Female     Birth control/protection: None   Other Topics Concern     Parent/sibling w/ CABG, MI or angioplasty before 65F 55M? No     Social Drivers of Health     Financial Resource Strain: Low Risk  (9/19/2024)    Financial Resource Strain      Within the past 12 months, have you or your family members you live with been unable to get utilities (heat, electricity) when it was really needed?: No   Food Insecurity: Low Risk  (9/19/2024)    Food Insecurity      Within the past 12 months, did you worry that your food would run out before you got money to buy more?:  No      Within the past 12 months, did the food you bought just not last and you didn t have money to get more?: No   Transportation Needs: Low Risk  (9/19/2024)    Transportation Needs      Within the past 12 months, has lack of transportation kept you from medical appointments, getting your medicines, non-medical meetings or appointments, work, or from getting things that you need?: No   Physical Activity: Insufficiently Active (9/19/2024)    Exercise Vital Sign      Days of Exercise per Week: 3 days      Minutes of Exercise per Session: 30 min   Stress: No Stress Concern Present (9/19/2024)    [employer] - Occupational Stress Questionnaire      Feeling of Stress : Only a little   Social Connections: Unknown (9/19/2024)    Social Connection and Isolation Panel [NHANES]      Frequency of Social Gatherings with Friends and Family: Twice a week   Interpersonal Safety: Low Risk  (4/23/2025)    Interpersonal Safety      Do you feel physically and emotionally safe where you currently live?: Yes      Within the past 12 months, have you been hit, slapped, kicked or otherwise physically hurt by someone?: No      Within the past 12 months, have you been humiliated or emotionally abused in other ways by your partner or ex-partner?: No   Housing Stability: Low Risk  (9/19/2024)    Housing Stability      Do you have housing? : Yes      Are you worried about losing your housing?: No       Family History  Family History   Problem Relation Age of Onset     Hypertension Mother      Hypertension Father      Ulcers Father      Hypertension Sister      Hyperlipidemia Sister      Hypertension Sister      Hypertension Brother      Lung Cancer Brother      Lung Cancer Maternal Grandmother      Cerebrovascular Disease Maternal Grandfather      Coronary Artery Disease Paternal Grandmother        Review of Systems  As per HPI and PMHx, otherwise 10+ comprehensive system review is negative.    Physical Exam  BP  "118/68   Temp (!) 96.7  F (35.9  C) (Temporal)   Ht 1.727 m (5' 8\")   Wt 90.8 kg (200 lb 1.6 oz)   BMI 30.43 kg/m    GENERAL: Patient is a pleasant, cooperative 69 year old male in no acute distress.  HEAD: Normocephalic, atraumatic.  Hair and scalp are normal.  EYES: Pupils are equal, round, reactive to light and accommodation.  Extraocular movements are intact.  The sclera nonicteric without injection.  The extraocular structures are normal.  EARS: Normal shape and symmetry.  No mastoid tenderness, fluctuance, or erythema.    NOSE: Nares are patent.  Nasal mucosa is pink.  ORAL CAVITY: Dentition is in good repair.  Mucous membranes are moist.  Tongue is mobile, protrudes to the midline.  Palate elevates symmetrically.  Tonsils are +0.  No erythema or exudate.  No oral cavity or oropharyngeal masses, lesions, ulcerations, leukoplakia.  NECK: Supple, trachea is midline.  There no palpable cervical lymphadenopathy or masses bilaterally.  Palpation of the bilateral parotid and submandibular areas reveal no masses.  No thyromegaly.    NEUROLOGIC: Cranial nerves II through XII are grossly intact.  Voice is strong.  Patient is House-Brackmann I/VI bilaterally.  CARDIOVASCULAR: Extremities are warm and well-perfused.  No significant peripheral edema.  RESPIRATORY: Patient has nonlabored breathing without cough, wheeze, stridor.  PSYCHIATRIC: Patient is alert and oriented.  Mood and affect appear normal.  SKIN: Warm and dry.  No scalp, face, or neck lesions noted.    Audiogram  The patient underwent an audiogram performed today.  My review of the audiogram shows normal from 250-4000 HZ sloping to moderate severe likely SNHL right and normal hearing left.  Pure-tone average is 8 dB on the right and 7 dB on the left.  Speech reception threshold is 5 dB on the right and 5 dB on the left.  The patient had 100% word recognition on the right and 100% word recognition on the left.  The patient had a A tympanogram on the right " and a A tympanogram on the left.          Assessment and Plan     ICD-10-CM    1. Sensorineural hearing loss (SNHL) of right ear with unrestricted hearing of left ear  H90.41         It was my pleasure seeing Ashkan Rodriguez today in clinic.  Overall, the patient's symptoms have improved. Based on his audiology examination, in the right ear there is a decrease in hearing starting at 2000 HZ down to 8000 HZ indicating a potentially sensorineural hearing loss. Wd discussed this could be related to his recent infection, genetics, or noise exposure throughout the years.     We discussed getting an MRI to rule out a acoustic neuroma on the hearing nerve. However, based on the word recognition score of 100% bilaterally, I feel this is less likely at this point.     We will follow up in 1 year to repeat hearing test and ENT follow up. Sooner if symptoms worsen.     AUSTIN Lambert CNP  Otolaryngology  [facility]      Again, thank you for allowing me to participate in the care of your patient.        Sincerely,        AUSTIN Lambert CNP    Electronically signed

## 2025-08-25 ENCOUNTER — PATIENT OUTREACH (OUTPATIENT)
Dept: CARE COORDINATION | Facility: CLINIC | Age: 70
End: 2025-08-25
Payer: COMMERCIAL